# Patient Record
Sex: FEMALE | Race: WHITE | Employment: OTHER | URBAN - METROPOLITAN AREA
[De-identification: names, ages, dates, MRNs, and addresses within clinical notes are randomized per-mention and may not be internally consistent; named-entity substitution may affect disease eponyms.]

---

## 2017-02-27 ENCOUNTER — OFFICE VISIT (OUTPATIENT)
Dept: CARDIOLOGY CLINIC | Age: 77
End: 2017-02-27

## 2017-02-27 VITALS
DIASTOLIC BLOOD PRESSURE: 80 MMHG | OXYGEN SATURATION: 96 % | HEIGHT: 66 IN | RESPIRATION RATE: 15 BRPM | HEART RATE: 62 BPM | BODY MASS INDEX: 26.06 KG/M2 | SYSTOLIC BLOOD PRESSURE: 136 MMHG | WEIGHT: 162.12 LBS

## 2017-02-27 DIAGNOSIS — I48.0 PAROXYSMAL ATRIAL FIBRILLATION (HCC): Primary | ICD-10-CM

## 2017-02-27 DIAGNOSIS — R06.02 SOB (SHORTNESS OF BREATH): ICD-10-CM

## 2017-02-27 DIAGNOSIS — E05.90 HYPERTHYROIDISM: ICD-10-CM

## 2017-02-27 DIAGNOSIS — E78.5 HYPERLIPIDEMIA, UNSPECIFIED HYPERLIPIDEMIA TYPE: ICD-10-CM

## 2017-02-27 PROCEDURE — 99214 OFFICE O/P EST MOD 30 MIN: CPT | Performed by: INTERNAL MEDICINE

## 2017-06-19 ENCOUNTER — HOSPITAL ENCOUNTER (OUTPATIENT)
Dept: ENDOSCOPY | Age: 77
Discharge: HOME | End: 2017-06-19
Attending: INTERNAL MEDICINE | Admitting: INTERNAL MEDICINE

## 2017-06-19 LAB
EKG ATRIAL RATE: 56 BPM
EKG DIAGNOSIS: NORMAL
EKG P AXIS: 52 DEGREES
EKG P-R INTERVAL: 272 MS
EKG Q-T INTERVAL: 446 MS
EKG QRS DURATION: 74 MS
EKG QTC CALCULATION (BAZETT): 430 MS
EKG R AXIS: -38 DEGREES
EKG T AXIS: 46 DEGREES
EKG VENTRICULAR RATE: 56 BPM

## 2017-06-19 PROCEDURE — 93010 ELECTROCARDIOGRAM REPORT: CPT | Performed by: INTERNAL MEDICINE

## 2017-06-19 RX ORDER — SODIUM CHLORIDE 0.9 % (FLUSH) 0.9 %
10 SYRINGE (ML) INJECTION EVERY 12 HOURS SCHEDULED
Status: DISCONTINUED | OUTPATIENT
Start: 2017-06-19 | End: 2017-06-20 | Stop reason: HOSPADM

## 2017-06-19 RX ORDER — SODIUM CHLORIDE 9 MG/ML
INJECTION, SOLUTION INTRAVENOUS CONTINUOUS
Status: DISCONTINUED | OUTPATIENT
Start: 2017-06-19 | End: 2017-06-20 | Stop reason: HOSPADM

## 2017-06-19 RX ORDER — SODIUM CHLORIDE 0.9 % (FLUSH) 0.9 %
10 SYRINGE (ML) INJECTION PRN
Status: DISCONTINUED | OUTPATIENT
Start: 2017-06-19 | End: 2017-06-20 | Stop reason: HOSPADM

## 2018-01-22 ENCOUNTER — OFFICE VISIT (OUTPATIENT)
Dept: ENT CLINIC | Age: 78
End: 2018-01-22

## 2018-01-22 VITALS — HEART RATE: 76 BPM | DIASTOLIC BLOOD PRESSURE: 72 MMHG | SYSTOLIC BLOOD PRESSURE: 110 MMHG

## 2018-01-22 DIAGNOSIS — R49.0 DYSPHONIA: Primary | ICD-10-CM

## 2018-01-22 PROCEDURE — 99213 OFFICE O/P EST LOW 20 MIN: CPT | Performed by: OTOLARYNGOLOGY

## 2018-01-22 NOTE — PROGRESS NOTES
The patient has noticed a weakening of her voice for the last 6 months. Initially she attributed this to postnasal drainage but of late this has not been contributing. She is followed for hyperthyroidism and a multinodular goiter    She works as a  but denies any vocal overuse or abuse    Denies difficulty chewing or swallowing. No sores have been seen in the mouth. There is no stridor or difficulty breathing. There is no past history of anterior neck or throat trauma. She is followed for atrial fibrillation and was recently started on Eliquis    The patient is a non smoker and is not exposed to second hand smoke or irritants. There have been no known contagious contacts. There are  no other symptoms referable to the upper aerodigestive tract. Exam:    The patient appears well developed and well nourished.; oriented to person, place, and time. The head is normocephalic and atraumatic; no facial scars or weakness are noted. The facial skeleton is normal.The voice has a normal quality and tonality to it. Eyes: Conjunctivae and lids normal. Full EOM. The skin is warm and dry. No pallor. The external nose is unremarkable including the dorsum, columella, nasion, and alae. The turbinates respond well to vasoconstriction. The middle and inferior meatuses appeared clear. There is no polyp, ulceration, or masses visualized either naris. The septum is not deviated or deflected to a significant degree. The lips and oral cavity are normal with no discrete mucosal disease, and normally hydrated. Tongue mobility is normal. The fungiform and vallate papillae are normal appearing. Dentition is unremarkable including the gingiva The soft palate and uvula are of normal configuration, with no displacement of the palatoglossal or palatopharyngeal folds, and no obstruction to the oropharynx. The posterior pharyngeal wall is normal appearing without exudate or drainage.   IL: Mirror exam shows a

## 2019-06-11 ENCOUNTER — APPOINTMENT (OUTPATIENT)
Dept: CT IMAGING | Age: 79
End: 2019-06-11
Payer: MEDICARE

## 2019-06-11 ENCOUNTER — HOSPITAL ENCOUNTER (EMERGENCY)
Age: 79
Discharge: ANOTHER HOSPITAL | End: 2019-06-11
Attending: EMERGENCY MEDICINE
Payer: MEDICARE

## 2019-06-11 VITALS
BODY MASS INDEX: 25.11 KG/M2 | OXYGEN SATURATION: 98 % | RESPIRATION RATE: 16 BRPM | HEIGHT: 67 IN | HEART RATE: 72 BPM | TEMPERATURE: 97.8 F | DIASTOLIC BLOOD PRESSURE: 82 MMHG | SYSTOLIC BLOOD PRESSURE: 186 MMHG | WEIGHT: 160 LBS

## 2019-06-11 DIAGNOSIS — R18.8 FREE FLUID IN PELVIS: ICD-10-CM

## 2019-06-11 DIAGNOSIS — Z79.01 ANTICOAGULATED: ICD-10-CM

## 2019-06-11 DIAGNOSIS — S32.009A CLOSED FRACTURE OF LUMBAR VERTEBRA, UNSPECIFIED FRACTURE MORPHOLOGY, UNSPECIFIED LUMBAR VERTEBRAL LEVEL, INITIAL ENCOUNTER (HCC): Primary | ICD-10-CM

## 2019-06-11 DIAGNOSIS — S22.31XA CLOSED FRACTURE OF ONE RIB OF RIGHT SIDE, INITIAL ENCOUNTER: ICD-10-CM

## 2019-06-11 LAB
A/G RATIO: 1.6 (ref 1.1–2.2)
ALBUMIN SERPL-MCNC: 4.7 G/DL (ref 3.4–5)
ALP BLD-CCNC: 78 U/L (ref 40–129)
ALT SERPL-CCNC: 18 U/L (ref 10–40)
ANION GAP SERPL CALCULATED.3IONS-SCNC: 11 MMOL/L (ref 3–16)
APTT: 40.8 SEC (ref 26–36)
AST SERPL-CCNC: 27 U/L (ref 15–37)
BASOPHILS ABSOLUTE: 0.1 K/UL (ref 0–0.2)
BASOPHILS RELATIVE PERCENT: 0.8 %
BILIRUB SERPL-MCNC: 1.7 MG/DL (ref 0–1)
BILIRUBIN URINE: NEGATIVE
BLOOD, URINE: ABNORMAL
BUN BLDV-MCNC: 18 MG/DL (ref 7–20)
CALCIUM SERPL-MCNC: 9.6 MG/DL (ref 8.3–10.6)
CHLORIDE BLD-SCNC: 103 MMOL/L (ref 99–110)
CLARITY: CLEAR
CO2: 26 MMOL/L (ref 21–32)
COLOR: YELLOW
CREAT SERPL-MCNC: 0.8 MG/DL (ref 0.6–1.2)
EOSINOPHILS ABSOLUTE: 0.1 K/UL (ref 0–0.6)
EOSINOPHILS RELATIVE PERCENT: 0.9 %
EPITHELIAL CELLS, UA: 1 /HPF (ref 0–5)
GFR AFRICAN AMERICAN: >60
GFR NON-AFRICAN AMERICAN: >60
GLOBULIN: 3 G/DL
GLUCOSE BLD-MCNC: 119 MG/DL (ref 70–99)
GLUCOSE URINE: NEGATIVE MG/DL
HCT VFR BLD CALC: 41 % (ref 36–48)
HEMOGLOBIN: 13.3 G/DL (ref 12–16)
HYALINE CASTS: 1 /LPF (ref 0–8)
INR BLD: 1.23 (ref 0.86–1.14)
KETONES, URINE: NEGATIVE MG/DL
LEUKOCYTE ESTERASE, URINE: ABNORMAL
LYMPHOCYTES ABSOLUTE: 1.2 K/UL (ref 1–5.1)
LYMPHOCYTES RELATIVE PERCENT: 13.9 %
MCH RBC QN AUTO: 29.1 PG (ref 26–34)
MCHC RBC AUTO-ENTMCNC: 32.4 G/DL (ref 31–36)
MCV RBC AUTO: 89.8 FL (ref 80–100)
MICROSCOPIC EXAMINATION: YES
MONOCYTES ABSOLUTE: 0.4 K/UL (ref 0–1.3)
MONOCYTES RELATIVE PERCENT: 4.9 %
NEUTROPHILS ABSOLUTE: 7.1 K/UL (ref 1.7–7.7)
NEUTROPHILS RELATIVE PERCENT: 79.5 %
NITRITE, URINE: NEGATIVE
PDW BLD-RTO: 14 % (ref 12.4–15.4)
PH UA: 7 (ref 5–8)
PLATELET # BLD: 273 K/UL (ref 135–450)
PMV BLD AUTO: 7.8 FL (ref 5–10.5)
POTASSIUM SERPL-SCNC: 4 MMOL/L (ref 3.5–5.1)
PROTEIN UA: 30 MG/DL
PROTHROMBIN TIME: 14 SEC (ref 9.8–13)
RBC # BLD: 4.56 M/UL (ref 4–5.2)
RBC UA: 6 /HPF (ref 0–4)
SODIUM BLD-SCNC: 140 MMOL/L (ref 136–145)
SPECIFIC GRAVITY UA: 1.01 (ref 1–1.03)
TOTAL PROTEIN: 7.7 G/DL (ref 6.4–8.2)
URINE REFLEX TO CULTURE: YES
URINE TYPE: ABNORMAL
UROBILINOGEN, URINE: 1 E.U./DL
WBC # BLD: 8.9 K/UL (ref 4–11)
WBC UA: 13 /HPF (ref 0–5)

## 2019-06-11 PROCEDURE — 6370000000 HC RX 637 (ALT 250 FOR IP): Performed by: PHYSICIAN ASSISTANT

## 2019-06-11 PROCEDURE — 72131 CT LUMBAR SPINE W/O DYE: CPT

## 2019-06-11 PROCEDURE — 74177 CT ABD & PELVIS W/CONTRAST: CPT

## 2019-06-11 PROCEDURE — 81001 URINALYSIS AUTO W/SCOPE: CPT

## 2019-06-11 PROCEDURE — 96360 HYDRATION IV INFUSION INIT: CPT

## 2019-06-11 PROCEDURE — 87086 URINE CULTURE/COLONY COUNT: CPT

## 2019-06-11 PROCEDURE — 85730 THROMBOPLASTIN TIME PARTIAL: CPT

## 2019-06-11 PROCEDURE — 72192 CT PELVIS W/O DYE: CPT

## 2019-06-11 PROCEDURE — 80053 COMPREHEN METABOLIC PANEL: CPT

## 2019-06-11 PROCEDURE — 99285 EMERGENCY DEPT VISIT HI MDM: CPT

## 2019-06-11 PROCEDURE — 2580000003 HC RX 258: Performed by: PHYSICIAN ASSISTANT

## 2019-06-11 PROCEDURE — 85610 PROTHROMBIN TIME: CPT

## 2019-06-11 PROCEDURE — 85025 COMPLETE CBC W/AUTO DIFF WBC: CPT

## 2019-06-11 PROCEDURE — 6360000004 HC RX CONTRAST MEDICATION: Performed by: EMERGENCY MEDICINE

## 2019-06-11 RX ORDER — HYDROCODONE BITARTRATE AND ACETAMINOPHEN 5; 325 MG/1; MG/1
1 TABLET ORAL ONCE
Status: COMPLETED | OUTPATIENT
Start: 2019-06-11 | End: 2019-06-11

## 2019-06-11 RX ORDER — LIDOCAINE 4 G/G
1 PATCH TOPICAL ONCE
Status: DISCONTINUED | OUTPATIENT
Start: 2019-06-11 | End: 2019-06-12 | Stop reason: HOSPADM

## 2019-06-11 RX ORDER — 0.9 % SODIUM CHLORIDE 0.9 %
1000 INTRAVENOUS SOLUTION INTRAVENOUS ONCE
Status: COMPLETED | OUTPATIENT
Start: 2019-06-11 | End: 2019-06-11

## 2019-06-11 RX ORDER — OXYCODONE HYDROCHLORIDE AND ACETAMINOPHEN 5; 325 MG/1; MG/1
1 TABLET ORAL ONCE
Status: COMPLETED | OUTPATIENT
Start: 2019-06-11 | End: 2019-06-11

## 2019-06-11 RX ADMIN — IOPAMIDOL 75 ML: 755 INJECTION, SOLUTION INTRAVENOUS at 21:13

## 2019-06-11 RX ADMIN — OXYCODONE HYDROCHLORIDE AND ACETAMINOPHEN 1 TABLET: 5; 325 TABLET ORAL at 21:51

## 2019-06-11 RX ADMIN — HYDROCODONE BITARTRATE AND ACETAMINOPHEN 1 TABLET: 5; 325 TABLET ORAL at 19:39

## 2019-06-11 RX ADMIN — SODIUM CHLORIDE 1000 ML: 9 INJECTION, SOLUTION INTRAVENOUS at 22:20

## 2019-06-11 ASSESSMENT — ENCOUNTER SYMPTOMS
NAUSEA: 0
BACK PAIN: 1
DIARRHEA: 0
ABDOMINAL PAIN: 0
BLOOD IN STOOL: 0
SHORTNESS OF BREATH: 0
VOMITING: 0

## 2019-06-11 ASSESSMENT — PAIN SCALES - GENERAL
PAINLEVEL_OUTOF10: 8
PAINLEVEL_OUTOF10: 10

## 2019-06-12 LAB — URINE CULTURE, ROUTINE: NORMAL

## 2019-06-12 NOTE — ED NOTES
Report called and given to Ventura Figueroa 69, ER Charge, at [facility]. No questions at this time. Report given to [facility]. No questions at this time. Pt A/O x4 at time of transport, no sign of distress. Belongings sent with family.       Gerald Reese RN  06/11/19 3335

## 2019-06-12 NOTE — ED PROVIDER NOTES
I independently performed a history and physical on DASIA Le. All diagnostic, treatment, and disposition decisions were made by myself in conjunction with the advanced practice provider. I have participated in the medical decision making and directed the treatment plan and disposition of the patient. For further details of [facility] emergency department encounter, please see the advanced practice provider's documentation. CHIEF COMPLAINT  Chief Complaint   Patient presents with    Fall     Pt reports she reached for board while doing yard work and fell onto back - pain to R side of low back x 1 hour     Briefly, DASIA Le is a 78 y.o. female  who presents to the ED complaining of reaching out for a board when doing yard work and then fell directly onto her right low back. She complains of right low back pain and right flank pain. She is anticoagulated. She denies any injury to the head or neck. She also denies any injuries to the extremities otherwise. FOCUSED PHYSICAL EXAMINATION  BP (!) 183/100   Pulse 71   Temp 98 °F (36.7 °C)   Resp 16   Ht 5' 7\" (1.702 m)   Wt 160 lb (72.6 kg)   SpO2 96%   BMI 25.06 kg/m²    Focused physical examination notable for no acute distress, well-appearing, well-nourished, normal speech and mentation without obvious facial droop, no obvious rash. No obvious cranial nerve deficits on my initial exam.  Bruising noted to the right low back and right flank, abdomen is soft nontender nondistended, there is midline lumbar spine tenderness but no cervical or thoracic spine tenderness. NC/AT. Normal strength and sensation in the legs. MDM:  Diagnostic considerations included intracranial injury, cervical spine injury, chest/abdominal organ injury, extremity injury, abrasion/laceration, contusion, fracture, sprain/strain, dislocation    ED course was notable for closed vertebral fractures of L1-L4 based on CT abdomen and pelvis.   The patient

## 2019-06-12 NOTE — ED PROVIDER NOTES
for chills and fever. Respiratory: Negative for shortness of breath. Cardiovascular: Negative for chest pain. Gastrointestinal: Negative for abdominal pain, blood in stool, diarrhea, nausea and vomiting. Genitourinary: Negative for difficulty urinating, dysuria and hematuria. Musculoskeletal: Positive for back pain. Skin: Negative for rash. Neurological: Negative for weakness, numbness and headaches. Positives and Pertinent negatives as per HPI. Except as noted abovein the ROS, all other systems were reviewed and negative. PAST MEDICAL HISTORY     Past Medical History:   Diagnosis Date    Atrial fibrillation ([facility] [facility]     Cancer ([facility] [address] [facility]     skin cancer    MVP (mitral valve prolapse)     Thyroid disorder     HYPER         SURGICAL HISTORY     Past Surgical History:   Procedure Laterality Date    APPENDECTOMY      CHOLECYSTECTOMY      COLON SURGERY      COLONOSCOPY      HEMICOLECTOMY  7-17-13    Laparoscopic right    HYSTERECTOMY      TONSILLECTOMY           CURRENTMEDICATIONS       Discharge Medication List as of 6/11/2019 11:26 PM      CONTINUE these medications which have NOT CHANGED    Details   metoprolol succinate (TOPROL XL) 50 MG extended release tablet TAKE 1 TABLET DAILY, Disp-90 tablet, R-3      methimazole (TAPAZOLE) 5 MG tablet   Take 2.5 mg by mouth daily                ALLERGIES     Ciprofloxacin and Sulfa antibiotics    FAMILYHISTORY     History reviewed. No pertinent family history.        SOCIAL HISTORY       Social History     Socioeconomic History    Marital status:      Spouse name: None    Number of children: None    Years of education: None    Highest education level: None   Occupational History    None   Social Needs    Financial resource strain: None    Food insecurity:     Worry: None     Inability: None    Transportation needs:     Medical: None     Non-medical: None   Tobacco Use    Smoking status: Never Smoker    Smokeless tobacco: Never Used   Substance and Sexual Activity    Alcohol use: No    Drug use: No    Sexual activity: Never   Lifestyle    Physical activity:     Days per week: None     Minutes per session: None    Stress: None   Relationships    Social connections:     Talks on phone: None     Gets together: None     Attends Yazidi service: None     Active member of club or organization: None     Attends meetings of clubs or organizations: None     Relationship status: None    Intimate partner violence:     Fear of current or ex partner: None     Emotionally abused: None     Physically abused: None     Forced sexual activity: None   Other Topics Concern    None   Social History Narrative    None       SCREENINGS             PHYSICAL EXAM    (up to 7 for level 4, 8 or more for level 5)     ED Triage Vitals   BP Temp Temp src Pulse Resp SpO2 Height Weight   06/11/19 1922 06/11/19 1921 -- 06/11/19 1921 06/11/19 1921 06/11/19 1921 06/11/19 1921 06/11/19 1921   (!) 183/100 98 °F (36.7 °C)  71 16 96 % 5' 7\" (1.702 m) 160 lb (72.6 kg)       Physical Exam   Constitutional: She is oriented to person, place, and time. She appears well-developed and well-nourished. HENT:   Head: Atraumatic. Eyes: Right eye exhibits no discharge. Left eye exhibits no discharge. Neck: Normal range of motion. Cardiovascular: Normal rate, regular rhythm and normal heart sounds. Exam reveals no gallop and no friction rub. No murmur heard. Pulmonary/Chest: Effort normal and breath sounds normal. No stridor. No respiratory distress. She has no wheezes. She has no rales. Abdominal: Soft. Bowel sounds are normal. She exhibits no distension and no mass. There is no tenderness. There is no rebound and no guarding. No hernia. Musculoskeletal: Normal range of motion. She exhibits tenderness. She exhibits no edema or deformity. Tenderness with very mild early ecchymosis over the right paralumbar musculature.   No point tenderness or step-off deformity in the neck or back. Some mild pain with flexion of right hip in the right low back. Negative contralateral straight leg raise. Full range of motion with flexion-extension bilateral hips, knees, ankles. Sensation light touch bilateral lower extremity is intact. Good up and downgoing toes. No foot drop. Neurological: She is alert and oriented to person, place, and time. No cranial nerve deficit. Skin: Skin is warm and dry. No rash noted. She is not diaphoretic. No erythema. Psychiatric: She has a normal mood and affect. Her behavior is normal.   Nursing note and vitals reviewed.       DIAGNOSTIC RESULTS   LABS:    Labs Reviewed   URINE RT REFLEX TO CULTURE - Abnormal; Notable for the following components:       Result Value    Blood, Urine SMALL (*)     Protein, UA 30 (*)     Leukocyte Esterase, Urine SMALL (*)     All other components within normal limits    Narrative:     Performed at:  [facility] [address],  [place], [place]   Phone [phone]   MICROSCOPIC URINALYSIS - Abnormal; Notable for the following components:    WBC, UA 13 (*)     RBC, UA 6 (*)     All other components within normal limits    Narrative:     Performed at:  [facility] [address], [place], [address]   Phone [phone]   COMPREHENSIVE METABOLIC PANEL - Abnormal; Notable for the following components:    Glucose 119 (*)     Total Bilirubin 1.7 (*)     All other components within normal limits    Narrative:     Performed at:  [facility] [address], [facility] [place]   Phone [phone]   APTT - Abnormal; Notable for the following components:    aPTT 40.8 (*)     All other components within normal limits    Narrative:     Performed at:  [facility] [address]  [place], [place]   Phone [phone] - Abnormal; Notable for the following components: Protime 14.0 (*)     INR 1.23 (*)     All other components within normal limits    Narrative:     Performed at:  [facility]  [address],  [place], [address]   Phone [phone]   URINE CULTURE   CBC WITH AUTO DIFFERENTIAL    Narrative:     Performed at:  [facility]  [address],  [place], [address]   Phone [phone]       All other labs were within normal range or not returned as of this dictation. EKG: All EKG's are interpreted by the Emergency Department Physician who either signs orCo-signs this chart in the absence of a cardiologist.  Please see their note for interpretation of EKG. RADIOLOGY:   Non-plain film images such as CT, Ultrasound and MRI are read by the radiologist. Plain radiographic images are visualized andpreliminarily interpreted by the  ED Provider with the below findings:        Interpretation perthe Radiologist below, if available at the time of this note:    CT ABDOMEN PELVIS W IV CONTRAST Additional Contrast? None   Final Result   Acute fractures of the right 1st through 4th lumbar transverse processes. Suspected nondisplaced right 12th rib fracture posteriorly. No solid visceral injury is detected. Small amount of free pelvic fluid, nonspecific but unusual for the patient's   age and gender. Close clinical surveillance recommended. CT PELVIS WO CONTRAST Additional Contrast? None   Final Result   No evidence of an acute fracture. If clinical symptoms persist, a follow-up   study may be helpful. CT LUMBAR SPINE WO CONTRAST   Final Result   Acute right L1 and L2 transverse process fractures. No results found.        PROCEDURES   Unless otherwise noted below, none     Procedures    CRITICAL CARE TIME   N/A    CONSULTS:  IP CONSULT TO TRAUMA SURGERY      EMERGENCY DEPARTMENT COURSE and DIFFERENTIALDIAGNOSIS/MDM:   Vitals:    Vitals:    06/11/19 1921 06/11/19 1922 06/11/19 2245   BP:  (!) 183/100 (!) 186/82   Pulse: 71  72   Resp: 16  16   Temp: 98 °F (36.7 °C)  97.8 °F (36.6 °C)   SpO2: 96%  98%   Weight: 160 lb (72.6 kg)     Height: 5' 7\" (1.702 m)         Patient was given thefollowing medications:  Medications   HYDROcodone-acetaminophen (NORCO) 5-325 MG per tablet 1 tablet (1 tablet Oral Given 6/11/19 1939)   iopamidol (ISOVUE-370) 76 % injection 75 mL (75 mLs Intravenous Given 6/11/19 2113)   oxyCODONE-acetaminophen (PERCOCET) 5-325 MG per tablet 1 tablet (1 tablet Oral Given 6/11/19 2151)   0.9 % sodium chloride bolus (0 mLs Intravenous Stopped 6/11/19 2318)       Patient presented with some right low back pain after mechanical fall. She had some ecchymosis of her right low back with some tenderness just right of the spine. CT of the pelvis and lumbar spine reveals transverse process fractures. She does have some hematuria given that she is anticoagulated CT of the abdomen and pelvis was obtained. This reveals a L1-L4 transverse process fracture with also possible 12th rib fracture with pelvic fluid noted. Given that she is anticoagulated with trauma attending did discuss this with [employer] as concern is this could be blood. Hemoglobin is stable here. She was given some IV fluids. She is adamant she did not hit her head or remembers the entire injury. She is been able to ambulate here. Low suspicion for cord injury, cauda equina or other emergent etiology. Abdominal exam remains benign. Vitals are unremarkable. Patient understood why she needed to be transferred and was stable at time of transfer. FINAL IMPRESSION      1. Closed fracture of lumbar vertebra, unspecified fracture morphology, unspecified lumbar vertebral level, initial encounter ([facility] [address]    2. Free fluid in pelvis    3. Anticoagulated    4.  Closed fracture of one rib of right side, initial encounter          DISPOSITION/PLAN   DISPOSITION Decision To Transfer 06/11/2019 09:54:31 PM      PATIENT REFERREDTO:  No follow-up provider specified.     DISCHARGE MEDICATIONS:  Discharge Medication List as of 6/11/2019 11:26 PM          DISCONTINUED MEDICATIONS:  Discharge Medication List as of 6/11/2019 11:26 PM                 (Please note that portions ofthis note were completed with a voice recognition program.  Efforts were made to edit the dictations but occasionally words are mis-transcribed.)    Isael Iniguez PA-C (electronically signed)           Isael Iniguez PA-C  06/12/19 7189

## 2019-12-02 ENCOUNTER — OFFICE VISIT (OUTPATIENT)
Dept: CARDIOLOGY CLINIC | Age: 79
End: 2019-12-02
Payer: MEDICARE

## 2019-12-02 VITALS
WEIGHT: 162.12 LBS | HEART RATE: 64 BPM | HEIGHT: 67 IN | BODY MASS INDEX: 25.45 KG/M2 | SYSTOLIC BLOOD PRESSURE: 124 MMHG | DIASTOLIC BLOOD PRESSURE: 76 MMHG

## 2019-12-02 DIAGNOSIS — R53.83 OTHER FATIGUE: ICD-10-CM

## 2019-12-02 DIAGNOSIS — I48.19 PERSISTENT ATRIAL FIBRILLATION (HCC): Primary | ICD-10-CM

## 2019-12-02 DIAGNOSIS — E78.5 HYPERLIPIDEMIA, UNSPECIFIED HYPERLIPIDEMIA TYPE: ICD-10-CM

## 2019-12-02 PROCEDURE — G8484 FLU IMMUNIZE NO ADMIN: HCPCS | Performed by: INTERNAL MEDICINE

## 2019-12-02 PROCEDURE — 1036F TOBACCO NON-USER: CPT | Performed by: INTERNAL MEDICINE

## 2019-12-02 PROCEDURE — G8400 PT W/DXA NO RESULTS DOC: HCPCS | Performed by: INTERNAL MEDICINE

## 2019-12-02 PROCEDURE — 1123F ACP DISCUSS/DSCN MKR DOCD: CPT | Performed by: INTERNAL MEDICINE

## 2019-12-02 PROCEDURE — G8417 CALC BMI ABV UP PARAM F/U: HCPCS | Performed by: INTERNAL MEDICINE

## 2019-12-02 PROCEDURE — 1090F PRES/ABSN URINE INCON ASSESS: CPT | Performed by: INTERNAL MEDICINE

## 2019-12-02 PROCEDURE — 4040F PNEUMOC VAC/ADMIN/RCVD: CPT | Performed by: INTERNAL MEDICINE

## 2019-12-02 PROCEDURE — G8427 DOCREV CUR MEDS BY ELIG CLIN: HCPCS | Performed by: INTERNAL MEDICINE

## 2019-12-02 PROCEDURE — 99214 OFFICE O/P EST MOD 30 MIN: CPT | Performed by: INTERNAL MEDICINE

## 2019-12-02 RX ORDER — FLUTICASONE PROPIONATE 50 MCG
2 SPRAY, SUSPENSION (ML) NASAL
COMMUNITY
Start: 2019-04-29

## 2019-12-02 RX ORDER — ACETAMINOPHEN 500 MG
500 TABLET ORAL EVERY 6 HOURS PRN
COMMUNITY

## 2020-03-09 ENCOUNTER — TELEPHONE (OUTPATIENT)
Dept: CARDIOLOGY CLINIC | Age: 80
End: 2020-03-09

## 2020-03-09 NOTE — TELEPHONE ENCOUNTER
Pt states she has increased sob and heaviness and fatigue. x couple of weeks States she has had some high BP at recent doctor visits, today it was 178/104.  Please call to advise

## 2020-03-10 ENCOUNTER — OFFICE VISIT (OUTPATIENT)
Dept: CARDIOLOGY CLINIC | Age: 80
End: 2020-03-10
Payer: MEDICARE

## 2020-03-10 VITALS
WEIGHT: 159.4 LBS | DIASTOLIC BLOOD PRESSURE: 80 MMHG | BODY MASS INDEX: 25.62 KG/M2 | HEIGHT: 66 IN | HEART RATE: 70 BPM | RESPIRATION RATE: 18 BRPM | SYSTOLIC BLOOD PRESSURE: 152 MMHG | OXYGEN SATURATION: 98 %

## 2020-03-10 PROCEDURE — 0296T PR EXT ECG > 48HR TO 21 DAY RCRD W/CONECT INTL RCRD: CPT | Performed by: INTERNAL MEDICINE

## 2020-03-10 PROCEDURE — G8417 CALC BMI ABV UP PARAM F/U: HCPCS | Performed by: INTERNAL MEDICINE

## 2020-03-10 PROCEDURE — G8427 DOCREV CUR MEDS BY ELIG CLIN: HCPCS | Performed by: INTERNAL MEDICINE

## 2020-03-10 PROCEDURE — 1036F TOBACCO NON-USER: CPT | Performed by: INTERNAL MEDICINE

## 2020-03-10 PROCEDURE — 93000 ELECTROCARDIOGRAM COMPLETE: CPT | Performed by: INTERNAL MEDICINE

## 2020-03-10 PROCEDURE — 99214 OFFICE O/P EST MOD 30 MIN: CPT | Performed by: INTERNAL MEDICINE

## 2020-03-10 PROCEDURE — 1090F PRES/ABSN URINE INCON ASSESS: CPT | Performed by: INTERNAL MEDICINE

## 2020-03-10 PROCEDURE — 1123F ACP DISCUSS/DSCN MKR DOCD: CPT | Performed by: INTERNAL MEDICINE

## 2020-03-10 PROCEDURE — G8484 FLU IMMUNIZE NO ADMIN: HCPCS | Performed by: INTERNAL MEDICINE

## 2020-03-10 PROCEDURE — G8400 PT W/DXA NO RESULTS DOC: HCPCS | Performed by: INTERNAL MEDICINE

## 2020-03-10 PROCEDURE — 4040F PNEUMOC VAC/ADMIN/RCVD: CPT | Performed by: INTERNAL MEDICINE

## 2020-03-10 RX ORDER — LISINOPRIL 10 MG/1
10 TABLET ORAL DAILY
Qty: 90 TABLET | Refills: 1 | Status: SHIPPED | OUTPATIENT
Start: 2020-03-10 | End: 2020-04-20 | Stop reason: SDUPTHER

## 2020-03-10 NOTE — PROGRESS NOTES
BENITO ordered a 7 day monitor for this pt. I educated the pt and spouse and placed a CAM on her. She verbalized understanding.

## 2020-03-10 NOTE — TELEPHONE ENCOUNTER
Spoke to the pt. Stated she went to the emergency room, saw the waiting room was full of people wearing mask. That scared her, so she went back home. Asking if she can be seen today. Medication list is correct.

## 2020-03-10 NOTE — PROGRESS NOTES
[address]   Cardiac Follow Up     Referring Provider:  Basia Pritchard MD     Chief Complaint   Patient presents with    Hypertension     Some S. O.B     Shortness of Breath        History of Present Illness:   Mrs Tana Chavarria is a [de-identified] y.o. female with a history of persistent afib. She states that she continues to feel very tired. She walks at the mall for exercise, she states that when she first begins to walk at times she will have chest tightness and shortness of breath. She continues to feel symptomatic with afib. Past Medical History:   has a past medical history of Atrial fibrillation ([facility] [address] Cancer ([facility] [address] MVP (mitral valve prolapse), and Thyroid disorder. Surgical History:   has a past surgical history that includes Colonoscopy; Hysterectomy; Tonsillectomy; Cholecystectomy; Appendectomy; hemicolectomy (7-17-13); and Colon surgery. Social History:   reports that she has never smoked. She has never used smokeless tobacco. She reports that she does not drink alcohol or use drugs. Family History:  No h/o premature CAD     Home Medications:  Prior to Admission medications    Medication Sig Start Date End Date Taking?  Authorizing Provider   vitamin D (CHOLECALCIFEROL) 1000 UNIT TABS tablet Take 1,500 Units by mouth   Yes Historical Provider, MD   apixaban (ELIQUIS) 5 MG TABS tablet Take 5 mg by mouth 3/25/19  Yes Historical Provider, MD   fluticasone (FLONASE) 50 MCG/ACT nasal spray 2 sprays by Nasal route 4/29/19  Yes Historical Provider, MD   acetaminophen (TYLENOL) 500 MG tablet Take 500 mg by mouth every 6 hours as needed for Pain   Yes Historical Provider, MD   Omega-3 Fatty Acids (FISH OIL OMEGA-3 PO) Take by mouth daily   Yes Historical Provider, MD   Calcium Carbonate Antacid (TUMS CALCIUM FOR LIFE BONE PO) Take by mouth daily   Yes Historical Provider, MD   metoprolol succinate (TOPROL XL) 50 MG extended release tablet TAKE 1 TABLET DAILY 11/23/16  Yes Leonard Medina MD methimazole (TAPAZOLE) 5 MG tablet   Take 2.5 mg by mouth daily    Yes Historical Provider, MD        Allergies:  Ciprofloxacin and Sulfa antibiotics     Review of Systems:   · Constitutional: there has been no unanticipated weight loss. There's been no change in , sleep pattern, or activity level.   +fatigue   · Eyes: No visual changes or diplopia. No scleral icterus. · ENT: No Headaches, hearing loss or vertigo. No mouth sores or sore throat. +hoarseness  · Cardiovascular: Reviewed in HPI  · Respiratory: No cough or wheezing, no sputum production. No hematemesis. · Gastrointestinal: No abdominal pain, appetite loss, blood in stools. No change in bowel or bladder habits. · Genitourinary: No dysuria, trouble voiding, or hematuria. · Musculoskeletal:  No gait disturbance, weakness or joint complaints. · Integumentary: No rash or pruritis. · Neurological: No headache, diplopia, change in muscle strength, numbness or tingling. No change in gait, balance, coordination, mood, affect, memory, mentation, behavior. · Psychiatric: No anxiety, no depression. · Endocrine: No malaise, fatigue or temperature intolerance. No excessive thirst, fluid intake, or urination. No tremor. · Hematologic/Lymphatic: No abnormal bruising or bleeding, blood clots or swollen lymph nodes. · Allergic/Immunologic: No nasal congestion or hives.     Physical Examination:    Vitals:    03/10/20 1324   BP: (!) 152/80   Pulse:    Resp:    SpO2:         Constitutional and General Appearance:  No acute distress  Skin:good turgor,intact without lesions  HEENT: EOMI ,normal  Neck:no JVD    Respiratory:  · Normal excursion and expansion without use of accessory muscles  · Resp Auscultation: Normal breath sounds without dullness  Cardiovascular:  · The apical impulses not displaced  · Heart tones are crisp and normal  · Cervical veins are not engorged  · The carotid upstroke is normal in amplitude and contour without delay or

## 2020-03-17 ENCOUNTER — TELEPHONE (OUTPATIENT)
Dept: CARDIOLOGY CLINIC | Age: 80
End: 2020-03-17

## 2020-03-17 NOTE — TELEPHONE ENCOUNTER
Patient is still having high blood pressures 178/95. Patient had a medication change and is still having a high blood pressure.   robert

## 2020-03-19 ENCOUNTER — TELEPHONE (OUTPATIENT)
Dept: CARDIOLOGY CLINIC | Age: 80
End: 2020-03-19

## 2020-03-19 NOTE — TELEPHONE ENCOUNTER
On 3/17  was told to increase her lisinopril from 10mg to 20mg (10mg QAM and 10mg QPM). 149/93 last night, this morning 143/106 before meds , and this afternoon 139/99. Should  change her meds ?

## 2020-03-26 ENCOUNTER — OFFICE VISIT (OUTPATIENT)
Dept: CARDIOLOGY CLINIC | Age: 80
End: 2020-03-26
Payer: MEDICARE

## 2020-03-26 VITALS
OXYGEN SATURATION: 95 % | HEART RATE: 75 BPM | WEIGHT: 157.7 LBS | BODY MASS INDEX: 25.34 KG/M2 | SYSTOLIC BLOOD PRESSURE: 120 MMHG | HEIGHT: 66 IN | DIASTOLIC BLOOD PRESSURE: 90 MMHG

## 2020-03-26 PROCEDURE — 4040F PNEUMOC VAC/ADMIN/RCVD: CPT | Performed by: INTERNAL MEDICINE

## 2020-03-26 PROCEDURE — 1090F PRES/ABSN URINE INCON ASSESS: CPT | Performed by: INTERNAL MEDICINE

## 2020-03-26 PROCEDURE — 1036F TOBACCO NON-USER: CPT | Performed by: INTERNAL MEDICINE

## 2020-03-26 PROCEDURE — G8484 FLU IMMUNIZE NO ADMIN: HCPCS | Performed by: INTERNAL MEDICINE

## 2020-03-26 PROCEDURE — 1123F ACP DISCUSS/DSCN MKR DOCD: CPT | Performed by: INTERNAL MEDICINE

## 2020-03-26 PROCEDURE — G8417 CALC BMI ABV UP PARAM F/U: HCPCS | Performed by: INTERNAL MEDICINE

## 2020-03-26 PROCEDURE — 0298T PR EXT ECG > 48HR TO 21 DAY REVIEW AND INTERPRETATN: CPT | Performed by: INTERNAL MEDICINE

## 2020-03-26 PROCEDURE — G8400 PT W/DXA NO RESULTS DOC: HCPCS | Performed by: INTERNAL MEDICINE

## 2020-03-26 PROCEDURE — 99214 OFFICE O/P EST MOD 30 MIN: CPT | Performed by: INTERNAL MEDICINE

## 2020-03-26 PROCEDURE — G8427 DOCREV CUR MEDS BY ELIG CLIN: HCPCS | Performed by: INTERNAL MEDICINE

## 2020-03-26 NOTE — PROGRESS NOTES
3 months for 7 day cardiac monitor. I appreciate the opportunity of cooperating in the care of this individual.    Orlando Guevara. Nerissa Kat M.D., [facility]    Patient's problem list, medications, allergies, past medical, surgical, social and family histories were reviewed and updated as appropriate. Scribe's attestation: This note was scribed in the presence of Dr. Nerissa Kat MD, by Lexie Porter RN. The scribe's documentation has been prepared under my direction and personally reviewed by me in its entirety. I confirm that the note above accurately reflects all work, treatment, procedures, and medical decision making performed by me.

## 2020-03-26 NOTE — TELEPHONE ENCOUNTER
RX APPROVAL:      Refill:   Requested Prescriptions      No prescriptions requested or ordered in this encounter      Last OV: 3/26/2020   Last EKG:   Last Labs:   Lab Results   Component Value Date    GLUCOSE 119 06/11/2019    BUN 18 06/11/2019    CREATININE 0.8 06/11/2019    LABGLOM >60 06/11/2019     06/11/2019    K 4.0 06/11/2019     06/11/2019    CO2 26 06/11/2019    CALCIUM 9.6 06/11/2019     Lab Results   Component Value Date     06/11/2019     06/11/2019    CO2 26 06/11/2019    ANIONGAP 11 06/11/2019    GLUCOSE 119 06/11/2019    BUN 18 06/11/2019    CREATININE 0.8 06/11/2019    LABGLOM >60 06/11/2019    GFRAA >60 06/11/2019    CALCIUM 9.6 06/11/2019    PROT 7.7 06/11/2019    LABALBU 4.7 06/11/2019    BILITOT 1.7 06/11/2019    ALKPHOS 78 06/11/2019    AST 27 06/11/2019    ALT 18 06/11/2019    GLOB 3.0 06/11/2019     Lab Results   Component Value Date    ALT 18 06/11/2019    AST 27 06/11/2019     Lab Results   Component Value Date    K 4.0 06/11/2019       Plan and labs reviewed

## 2020-03-30 ENCOUNTER — TELEPHONE (OUTPATIENT)
Dept: CARDIOLOGY CLINIC | Age: 80
End: 2020-03-30

## 2020-03-30 NOTE — TELEPHONE ENCOUNTER
NOE MARTIN Report    Holter monitor Report Results has been scanned into media and are ready for you to review.

## 2020-04-20 NOTE — TELEPHONE ENCOUNTER
Prescription refill    Last OV: 03/26/2020    Last Refill: 03/10/2020    Labs:  06/11/2019    Future Appt:  07/15/2020

## 2020-04-20 NOTE — TELEPHONE ENCOUNTER
Pt  calling pt was in to see LES on 03/26/2020 and after taking the lisinopril 1-2 wks it wasn't working so LES increased it to 20 mg daily. Pt is now running out of the supply she had and the Pharmacy states it's too soon to refill. Pt needs a new script for the 20 mg daily called into [facility] [address], [address] [facility] [phone].  Pls call to advise Thank you

## 2020-04-21 RX ORDER — LISINOPRIL 20 MG/1
20 TABLET ORAL DAILY
Qty: 90 TABLET | Refills: 3 | Status: SHIPPED | OUTPATIENT
Start: 2020-04-21 | End: 2020-07-16 | Stop reason: SDUPTHER

## 2020-04-21 NOTE — TELEPHONE ENCOUNTER
Pt states she will be out of medication on Friday 4/24/20.  Please send and call pt to advise when done

## 2020-06-08 ENCOUNTER — NURSE ONLY (OUTPATIENT)
Dept: CARDIOLOGY CLINIC | Age: 80
End: 2020-06-08
Payer: MEDICARE

## 2020-06-08 PROCEDURE — 0296T PR EXT ECG > 48HR TO 21 DAY RCRD W/CONECT INTL RCRD: CPT | Performed by: INTERNAL MEDICINE

## 2020-07-08 PROCEDURE — 0298T PR EXT ECG > 48HR TO 21 DAY REVIEW AND INTERPRETATN: CPT | Performed by: INTERNAL MEDICINE

## 2020-07-14 NOTE — PROGRESS NOTES
Review of Systems:   · Constitutional: there has been no unanticipated weight loss. There's been no change in , sleep pattern, or activity level.   +fatigue   · Eyes: No visual changes or diplopia. No scleral icterus. · ENT: No Headaches, hearing loss or vertigo. No mouth sores or sore throat. +hoarseness  · Cardiovascular: Reviewed in HPI  · Respiratory: No cough or wheezing, no sputum production. No hematemesis. · Gastrointestinal: No abdominal pain, appetite loss, blood in stools. No change in bowel or bladder habits. · Genitourinary: No dysuria, trouble voiding, or hematuria. · Musculoskeletal:  No gait disturbance, weakness or joint complaints. · Integumentary: No rash or pruritis. · Neurological: No headache, diplopia, change in muscle strength, numbness or tingling. No change in gait, balance, coordination, mood, affect, memory, mentation, behavior. · Psychiatric: No anxiety, no depression. · Endocrine: No malaise, fatigue or temperature intolerance. No excessive thirst, fluid intake, or urination. No tremor. · Hematologic/Lymphatic: No abnormal bruising or bleeding, blood clots or swollen lymph nodes. · Allergic/Immunologic: No nasal congestion or hives. Physical Examination:    Vitals:    07/15/20 1022   BP: 138/80   Pulse: 76        Constitutional and General Appearance:  No acute distress  Skin:good turgor,intact without lesions  HEENT: EOMI ,normal  Neck:no JVD    Respiratory:  · Normal excursion and expansion without use of accessory muscles  · Resp Auscultation: Normal breath sounds without dullness  Cardiovascular:  · The apical impulses not displaced  · Heart tones are crisp and normal. Irregularly irregular. · Cervical veins are not engorged  · The carotid upstroke is normal in amplitude and contour without delay or bruit  · Peripheral pulses are symmetrical and full  · There is no clubbing, cyanosis of the extremities. · No edema.   · Femoral Arteries: 2+ and equal  · Pedal Pulses: 2+ and equal   Abdomen:  · No masses or tenderness  · Liver/Spleen: No Abnormalities Noted  Neurological/Psychiatric:unusual affect  · Alert and oriented in all spheres  · Moves all extremities well  · Exhibits normal gait balance and coordination  · No abnormalities of mood, affect, memory, mentation, or behavior are noted    ECHO 6/9/2015    -Technically limited study due to lung interface.  -The patient appears to be in atrial fibrillation.  -Normal left ventricle size and systolic function with an estimated   ejection  fraction of 60%. No regional wall motion abnormalities are seen. -There is mild concentric left ventricular hypertrophy.  -Mild mitral regurgitation is present.  -Trivial aortic regurgitation is present.  -There is mild-moderate tricuspid regurgitation with RVSP estimated at 33  MmHg    6/15  CT abd     IMPRESSION: Widely patent mesenteric vessels. Other findings unchanged from the recent CT scan one day prior. Assessment:     1. Persistent afib: HR controlled, no flutters/palps  Remains on Eliquis 5mg BID  CAM monitor 6/2020> Afib 100% (repeat in one year)  EKG  3/10/20> Afib, HR 54      2. Fatigue/SOB, chronic: Improved     3. Hyperlipidemia: 10/2019>  hdl 74  ldl 142  Managed by pcp   Memory loss according to     4. Hypertension: Stable. Blood pressure 138/80, pulse 76, height 5' 6.5\" (1.689 m), weight 153 lb 12.8 oz (69.8 kg)  Early onset dementia    Plan:  Sheila Baldwin has a stable cardiac status. All cardiac test and lab results were personally reviewed by me during this office visit. 1. No med changes  2. Will continue with risk factor modification  3. Return to office in 6 months    I appreciate the opportunity of cooperating in the care of this individual.    Lamar Lowe. Zaria Camacho M.D., [facility] [place]    Patient's problem list, medications, allergies, past medical, surgical, social and family histories were reviewed and updated as appropriate.      Sean's attestation: This note was scribed in the presence of Dr. Quin Deras MD, by Thelma Simmonds, RN. The scribe's documentation has been prepared under my direction and personally reviewed by me in its entirety. I confirm that the note above accurately reflects all work, treatment, procedures, and medical decision making performed by me.

## 2020-07-15 ENCOUNTER — OFFICE VISIT (OUTPATIENT)
Dept: CARDIOLOGY CLINIC | Age: 80
End: 2020-07-15
Payer: MEDICARE

## 2020-07-15 VITALS
DIASTOLIC BLOOD PRESSURE: 80 MMHG | SYSTOLIC BLOOD PRESSURE: 138 MMHG | BODY MASS INDEX: 24.14 KG/M2 | HEART RATE: 76 BPM | HEIGHT: 67 IN | WEIGHT: 153.8 LBS

## 2020-07-15 PROCEDURE — 1123F ACP DISCUSS/DSCN MKR DOCD: CPT | Performed by: INTERNAL MEDICINE

## 2020-07-15 PROCEDURE — 4040F PNEUMOC VAC/ADMIN/RCVD: CPT | Performed by: INTERNAL MEDICINE

## 2020-07-15 PROCEDURE — G8427 DOCREV CUR MEDS BY ELIG CLIN: HCPCS | Performed by: INTERNAL MEDICINE

## 2020-07-15 PROCEDURE — G8420 CALC BMI NORM PARAMETERS: HCPCS | Performed by: INTERNAL MEDICINE

## 2020-07-15 PROCEDURE — 1090F PRES/ABSN URINE INCON ASSESS: CPT | Performed by: INTERNAL MEDICINE

## 2020-07-15 PROCEDURE — 1036F TOBACCO NON-USER: CPT | Performed by: INTERNAL MEDICINE

## 2020-07-15 PROCEDURE — G8400 PT W/DXA NO RESULTS DOC: HCPCS | Performed by: INTERNAL MEDICINE

## 2020-07-15 PROCEDURE — 99214 OFFICE O/P EST MOD 30 MIN: CPT | Performed by: INTERNAL MEDICINE

## 2020-07-15 NOTE — TELEPHONE ENCOUNTER
Per pt, please call [facility] and have them cancel the metoprolol 50 mg and add the new prescription for lisinopril. Pt keeps getting metoprolol but not the lisinopril. Please call pt to let them know it has been done.

## 2020-07-16 ENCOUNTER — TELEPHONE (OUTPATIENT)
Dept: CARDIOLOGY CLINIC | Age: 80
End: 2020-07-16

## 2020-07-16 RX ORDER — LISINOPRIL 20 MG/1
20 TABLET ORAL DAILY
Qty: 90 TABLET | Refills: 3 | Status: SHIPPED | OUTPATIENT
Start: 2020-07-16 | End: 2021-06-21

## 2020-10-15 ENCOUNTER — OFFICE VISIT (OUTPATIENT)
Dept: ENT CLINIC | Age: 80
End: 2020-10-15
Payer: MEDICARE

## 2020-10-15 VITALS — TEMPERATURE: 98 F | DIASTOLIC BLOOD PRESSURE: 98 MMHG | HEART RATE: 69 BPM | SYSTOLIC BLOOD PRESSURE: 156 MMHG

## 2020-10-15 PROCEDURE — 1090F PRES/ABSN URINE INCON ASSESS: CPT | Performed by: OTOLARYNGOLOGY

## 2020-10-15 PROCEDURE — G8420 CALC BMI NORM PARAMETERS: HCPCS | Performed by: OTOLARYNGOLOGY

## 2020-10-15 PROCEDURE — G8484 FLU IMMUNIZE NO ADMIN: HCPCS | Performed by: OTOLARYNGOLOGY

## 2020-10-15 PROCEDURE — G8427 DOCREV CUR MEDS BY ELIG CLIN: HCPCS | Performed by: OTOLARYNGOLOGY

## 2020-10-15 PROCEDURE — 99213 OFFICE O/P EST LOW 20 MIN: CPT | Performed by: OTOLARYNGOLOGY

## 2020-10-15 PROCEDURE — 4040F PNEUMOC VAC/ADMIN/RCVD: CPT | Performed by: OTOLARYNGOLOGY

## 2020-10-15 PROCEDURE — 1036F TOBACCO NON-USER: CPT | Performed by: OTOLARYNGOLOGY

## 2020-10-15 PROCEDURE — G8400 PT W/DXA NO RESULTS DOC: HCPCS | Performed by: OTOLARYNGOLOGY

## 2020-10-15 PROCEDURE — 1123F ACP DISCUSS/DSCN MKR DOCD: CPT | Performed by: OTOLARYNGOLOGY

## 2020-10-15 RX ORDER — OMEPRAZOLE 40 MG/1
40 CAPSULE, DELAYED RELEASE ORAL DAILY
Qty: 15 CAPSULE | Refills: 1 | Status: SHIPPED | OUTPATIENT
Start: 2020-10-15 | End: 2022-02-07

## 2020-10-15 RX ORDER — MONTELUKAST SODIUM 10 MG/1
10 TABLET ORAL NIGHTLY
Qty: 15 TABLET | Refills: 1 | Status: SHIPPED | OUTPATIENT
Start: 2020-10-15 | End: 2022-02-07

## 2020-10-15 NOTE — PROGRESS NOTES
erythema indicative of infection. There was not evidence of deviation of the septum which was not significant. There were not polyps present. .There were not other masses present. The turbinates were not enlarged beyond normal.  Hoarseness is likely to be related to some sinus drainage from allergy and also from laryngeal reflux. There is a third problem relative to her thyroid gland which does  need further evaluation. We will need her to try a course of Singulair along with omeprazole. A thyroid sonogram will be obtained as well.

## 2020-10-21 ENCOUNTER — HOSPITAL ENCOUNTER (OUTPATIENT)
Dept: ULTRASOUND IMAGING | Age: 80
Discharge: HOME | End: 2020-10-21
Payer: MEDICARE

## 2020-10-21 PROCEDURE — 76536 US EXAM OF HEAD AND NECK: CPT

## 2020-10-26 ENCOUNTER — TELEPHONE (OUTPATIENT)
Dept: CARDIOLOGY CLINIC | Age: 80
End: 2020-10-26

## 2020-10-26 NOTE — TELEPHONE ENCOUNTER
Medication Refill    Medication needing refilled:eliquis    Dosage of the medication:10mg     How are you taking this medication (QD, BID, TID, QID, PRN):    30 or 90 day supply called in:    Which Pharmacy are we sending the medication to?:  [facility]

## 2020-11-02 ENCOUNTER — HOSPITAL ENCOUNTER (OUTPATIENT)
Age: 80
Discharge: HOME | End: 2020-11-02
Payer: MEDICARE

## 2020-11-02 LAB
BASOPHILS ABSOLUTE: 0.1 K/UL (ref 0–0.2)
BASOPHILS RELATIVE PERCENT: 1.1 %
EOSINOPHILS ABSOLUTE: 0.1 K/UL (ref 0–0.6)
EOSINOPHILS RELATIVE PERCENT: 1.6 %
HCT VFR BLD CALC: 40.5 % (ref 36–48)
HEMOGLOBIN: 13.3 G/DL (ref 12–16)
LYMPHOCYTES ABSOLUTE: 1.9 K/UL (ref 1–5.1)
LYMPHOCYTES RELATIVE PERCENT: 28.3 %
MCH RBC QN AUTO: 28.9 PG (ref 26–34)
MCHC RBC AUTO-ENTMCNC: 32.9 G/DL (ref 31–36)
MCV RBC AUTO: 87.9 FL (ref 80–100)
MONOCYTES ABSOLUTE: 0.5 K/UL (ref 0–1.3)
MONOCYTES RELATIVE PERCENT: 7.7 %
NEUTROPHILS ABSOLUTE: 4 K/UL (ref 1.7–7.7)
NEUTROPHILS RELATIVE PERCENT: 61.3 %
PDW BLD-RTO: 14 % (ref 12.4–15.4)
PLATELET # BLD: 283 K/UL (ref 135–450)
PMV BLD AUTO: 7.4 FL (ref 5–10.5)
RBC # BLD: 4.61 M/UL (ref 4–5.2)
WBC # BLD: 6.6 K/UL (ref 4–11)

## 2020-11-02 PROCEDURE — 85025 COMPLETE CBC W/AUTO DIFF WBC: CPT

## 2020-11-02 PROCEDURE — 36415 COLL VENOUS BLD VENIPUNCTURE: CPT

## 2020-11-03 ENCOUNTER — HOSPITAL ENCOUNTER (OUTPATIENT)
Dept: CT IMAGING | Age: 80
Discharge: HOME | End: 2020-11-03
Payer: MEDICARE

## 2020-11-03 PROCEDURE — 6360000004 HC RX CONTRAST MEDICATION: Performed by: INTERNAL MEDICINE

## 2020-11-03 PROCEDURE — 74177 CT ABD & PELVIS W/CONTRAST: CPT

## 2020-11-03 RX ADMIN — IOPAMIDOL 75 ML: 755 INJECTION, SOLUTION INTRAVENOUS at 11:41

## 2020-11-03 RX ADMIN — IOHEXOL 50 ML: 240 INJECTION, SOLUTION INTRATHECAL; INTRAVASCULAR; INTRAVENOUS; ORAL at 10:30

## 2020-11-09 ENCOUNTER — TELEPHONE (OUTPATIENT)
Dept: CARDIOLOGY CLINIC | Age: 80
End: 2020-11-09

## 2020-11-09 NOTE — TELEPHONE ENCOUNTER
Just letting LES know she is having a colonoscopy 11/17 and she needs to hold her eliquis . How long should she hold eliquis ?

## 2020-11-09 NOTE — TELEPHONE ENCOUNTER
Spoke with the patient and advised her to hold Eliquis for 3 days prior to colonoscopy per LES. Patient voiced understanding .  Call complete

## 2021-01-12 NOTE — PROGRESS NOTES
[address]   Cardiac Follow Up     Referring Provider:  Dilcia Schwab MD     Chief Complaint   Patient presents with    Atrial Fibrillation    Hyperlipidemia    Hypertension      History of Present Illness:   Mrs Tez Malik is a [de-identified] y.o. female with a history of persistent afib. Today, she is here for regular follow up. Overall, she feels well. She denies exertional chest pain, palpitations, light-headedness, edema. She stays busy, does not smoke. Her  is with her for the visit. She tested positive for Covid in Dec with her  but was asymptomatic. Past Medical History:   has a past medical history of Atrial fibrillation ([address] Cancer ([facility] [address] MVP (mitral valve prolapse), and Thyroid disorder. Surgical History:   has a past surgical history that includes Colonoscopy; Hysterectomy; Tonsillectomy; Cholecystectomy; Appendectomy; hemicolectomy (7-17-13); and Colon surgery. Social History:   reports that she has never smoked. She has never used smokeless tobacco. She reports that she does not drink alcohol or use drugs. Family History:  No h/o premature CAD     Home Medications:  Prior to Admission medications    Medication Sig Start Date End Date Taking?  Authorizing Provider   apixaban (ELIQUIS) 5 MG TABS tablet Take 1 tablet by mouth 2 times daily 10/26/20  Yes Arti Delacruz MD   lisinopril (PRINIVIL;ZESTRIL) 20 MG tablet Take 1 tablet by mouth daily 7/16/20  Yes Arti Delacruz MD   vitamin D (CHOLECALCIFEROL) 1000 UNIT TABS tablet Take 1,500 Units by mouth   Yes Historical Provider, MD   fluticasone (FLONASE) 50 MCG/ACT nasal spray 2 sprays by Nasal route 4/29/19  Yes Historical Provider, MD   acetaminophen (TYLENOL) 500 MG tablet Take 500 mg by mouth every 6 hours as needed for Pain   Yes Historical Provider, MD   Omega-3 Fatty Acids (FISH OIL OMEGA-3 PO) Take by mouth daily   Yes Historical Provider, MD   Calcium Carbonate Antacid (TUMS CALCIUM FOR LIFE BONE PO) Take without dullness  Cardiovascular:  · The apical impulses not displaced  · Heart tones are crisp and normal. Irregularly irregular. · Cervical veins are not engorged  · The carotid upstroke is normal in amplitude and contour without delay or bruit  · Peripheral pulses are symmetrical and full  · There is no clubbing, cyanosis of the extremities. · No edema. · Femoral Arteries: 2+ and equal  · Pedal Pulses: 2+ and equal   Abdomen:  · No masses or tenderness  · Liver/Spleen: No Abnormalities Noted  Neurological/Psychiatric:unusual affect  · Alert and oriented in all spheres  · Moves all extremities well  · Exhibits normal gait balance and coordination  · No abnormalities of mood, affect, memory, mentation, or behavior are noted    ECHO 6/9/2015    -Technically limited study due to lung interface.  -The patient appears to be in atrial fibrillation.  -Normal left ventricle size and systolic function with an estimated   ejection  fraction of 60%. No regional wall motion abnormalities are seen. -There is mild concentric left ventricular hypertrophy.  -Mild mitral regurgitation is present.  -Trivial aortic regurgitation is present.  -There is mild-moderate tricuspid regurgitation with RVSP estimated at 33  MmHg    6/15  CT abd     IMPRESSION: Widely patent mesenteric vessels. Other findings unchanged from the recent CT scan one day prior. Assessment:     1. Persistent afib: HR controlled, no flutters/palps  Remains on Eliquis 5mg BID  CAM monitor 6/2020> Afib 100% (repeat in one year)  EKG  3/10/20> Afib, HR 54      2. Fatigue/SOB, chronic: Improved     3. Hyperlipidemia: 9/2020>  hdl 78  ldl 118  Managed by pcp   Memory loss according to     4. Hypertension: Stable. Blood pressure (!) 142/90, pulse 91, temperature 97.5 °F (36.4 °C), height 5' 7\" (1.702 m), weight 163 lb 6.4 oz (74.1 kg), SpO2 98 %, not currently breastfeeding.     5. Covid 19 in December- she states she was asymptomatic at the time Early onset dementia    Plan:  Digna Du has a stable cardiac status. All cardiac test and lab results were personally reviewed by me during this office visit. 1. No med changes- covid 19 antibody testing   2. Will continue with risk factor modification  3. Return to office in 6 months    I appreciate the opportunity of cooperating in the care of this individual.    Jacky Newman. Veda Carreon M.D., [facility] [place]    Patient's problem list, medications, allergies, past medical, surgical, social and family histories were reviewed and updated as appropriate. Scribe's attestation: This note was scribed in the presence of Dr. Veda Carreon MD, by Mague Ortiz RN. The scribe's documentation has been prepared under my direction and personally reviewed by me in its entirety. I confirm that the note above accurately reflects all work, treatment, procedures, and medical decision making performed by me.

## 2021-01-25 ENCOUNTER — OFFICE VISIT (OUTPATIENT)
Dept: CARDIOLOGY CLINIC | Age: 81
End: 2021-01-25
Payer: MEDICARE

## 2021-01-25 ENCOUNTER — HOSPITAL ENCOUNTER (OUTPATIENT)
Age: 81
Discharge: HOME | End: 2021-01-25
Payer: MEDICARE

## 2021-01-25 VITALS
HEART RATE: 91 BPM | TEMPERATURE: 97.5 F | BODY MASS INDEX: 25.65 KG/M2 | HEIGHT: 67 IN | WEIGHT: 163.4 LBS | SYSTOLIC BLOOD PRESSURE: 142 MMHG | OXYGEN SATURATION: 98 % | DIASTOLIC BLOOD PRESSURE: 90 MMHG

## 2021-01-25 DIAGNOSIS — U07.1 COVID-19 VIRUS INFECTION: ICD-10-CM

## 2021-01-25 DIAGNOSIS — I48.11 LONGSTANDING PERSISTENT ATRIAL FIBRILLATION (HCC): Primary | ICD-10-CM

## 2021-01-25 DIAGNOSIS — R06.02 SOB (SHORTNESS OF BREATH): ICD-10-CM

## 2021-01-25 DIAGNOSIS — I10 ESSENTIAL HYPERTENSION: ICD-10-CM

## 2021-01-25 DIAGNOSIS — E78.5 HYPERLIPIDEMIA, UNSPECIFIED HYPERLIPIDEMIA TYPE: ICD-10-CM

## 2021-01-25 PROCEDURE — G8417 CALC BMI ABV UP PARAM F/U: HCPCS | Performed by: INTERNAL MEDICINE

## 2021-01-25 PROCEDURE — G8427 DOCREV CUR MEDS BY ELIG CLIN: HCPCS | Performed by: INTERNAL MEDICINE

## 2021-01-25 PROCEDURE — 99214 OFFICE O/P EST MOD 30 MIN: CPT | Performed by: INTERNAL MEDICINE

## 2021-01-25 PROCEDURE — G8484 FLU IMMUNIZE NO ADMIN: HCPCS | Performed by: INTERNAL MEDICINE

## 2021-01-25 PROCEDURE — 4040F PNEUMOC VAC/ADMIN/RCVD: CPT | Performed by: INTERNAL MEDICINE

## 2021-01-25 PROCEDURE — 1090F PRES/ABSN URINE INCON ASSESS: CPT | Performed by: INTERNAL MEDICINE

## 2021-01-25 PROCEDURE — 86769 SARS-COV-2 COVID-19 ANTIBODY: CPT

## 2021-01-25 PROCEDURE — 1123F ACP DISCUSS/DSCN MKR DOCD: CPT | Performed by: INTERNAL MEDICINE

## 2021-01-25 PROCEDURE — G8400 PT W/DXA NO RESULTS DOC: HCPCS | Performed by: INTERNAL MEDICINE

## 2021-01-25 PROCEDURE — 1036F TOBACCO NON-USER: CPT | Performed by: INTERNAL MEDICINE

## 2021-01-27 LAB — SARS-COV-2, IGG: POSITIVE

## 2021-03-29 NOTE — TELEPHONE ENCOUNTER
Received refill request for Eliquis 5 mg from [facility].     Last OV: 1/25/21 LES    Last Labs: 11/2/20    Last Refill: 10/26/20 #180 with 1 refill    Next Appt: 7/14/21 LES

## 2021-03-30 RX ORDER — APIXABAN 5 MG/1
TABLET, FILM COATED ORAL
Qty: 180 TABLET | Refills: 1 | Status: SHIPPED | OUTPATIENT
Start: 2021-03-30 | End: 2021-11-09

## 2021-06-21 RX ORDER — LISINOPRIL 20 MG/1
TABLET ORAL
Qty: 90 TABLET | Refills: 3 | Status: SHIPPED | OUTPATIENT
Start: 2021-06-21 | End: 2022-06-22

## 2021-06-21 NOTE — TELEPHONE ENCOUNTER
Received refill request for Lisinopril 20 mg from [facility].     Last OV: 1/25/21 LES    Last Labs: 6/11/19     Last Refill: 7/16/20 #90 with 3 refills    Next Appt: 7/14/21 LES

## 2021-07-07 NOTE — PROGRESS NOTES
[facility]   Cardiac Follow Up     Referring Provider:  Brie Walsh MD     Chief Complaint   Patient presents with    Atrial Fibrillation    Hyperlipidemia      History of Present Illness:   Mrs Sundar Magaña is a 80 y.o. female with a history of persistent afib. Today, she is here for regular follow up. Overall, she feels well. She denies exertional chest pain, palpitations, light-headedness, edema. She stays busy, does not smoke. Her  is with her for the visit. She tested positive for Covid in Dec with her  but was asymptomatic. She has started Aricept for her memory. Continues w/ shortness of breath w/ steps, unchanged. Past Medical History:   has a past medical history of Atrial fibrillation ([address] Cancer ([address] MVP (mitral valve prolapse), and Thyroid disorder. Surgical History:   has a past surgical history that includes Colonoscopy; Hysterectomy; Tonsillectomy; Cholecystectomy; Appendectomy; hemicolectomy (7-17-13); and Colon surgery. Social History:   reports that she has never smoked. She has never used smokeless tobacco. She reports that she does not drink alcohol and does not use drugs. Family History:  No h/o premature CAD     Home Medications:  Prior to Admission medications    Medication Sig Start Date End Date Taking?  Authorizing Provider   donepezil (ARICEPT) 5 MG tablet TAKE 1 TABLET BY MOUTH AT BEDTIME 7/8/21  Yes Historical Provider, MD   Apoaequorin (PREVAGEN EXTRA STRENGTH) 20 MG CAPS Take by mouth   Yes Historical Provider, MD   lisinopril (PRINIVIL;ZESTRIL) 20 MG tablet TAKE 1 TABLET DAILY 6/21/21  Yes Zbigniew Hannon MD   ELIQUIS 5 MG TABS tablet TAKE 1 TABLET TWICE A DAY 3/30/21  Yes Zbigniew Hannon MD   vitamin D (CHOLECALCIFEROL) 1000 UNIT TABS tablet Take 1,500 Units by mouth   Yes Historical Provider, MD   fluticasone [facility]) 50 MCG/ACT nasal spray 2 sprays by Nasal route 4/29/19  Yes Historical Provider, MD   Omega-3 Fatty Acids ([address] and General Appearance:  No acute distress  Skin:good turgor,intact without lesions  HEENT: EOMI ,normal  Neck:no JVD    Respiratory:  · Normal excursion and expansion without use of accessory muscles  · Resp Auscultation: Normal breath sounds without dullness  Cardiovascular:  · The apical impulses not displaced  · Heart tones are crisp and normal. Irregularly irregular. · Cervical veins are not engorged  · The carotid upstroke is normal in amplitude and contour without delay or bruit  · Peripheral pulses are symmetrical and full  · There is no clubbing, cyanosis of the extremities. · No edema. · Femoral Arteries: 2+ and equal  · Pedal Pulses: 2+ and equal   Abdomen:  · No masses or tenderness  · Liver/Spleen: No Abnormalities Noted  Neurological/Psychiatric:unusual affect  · Alert and oriented in all spheres  · Moves all extremities well  · Exhibits normal gait balance and coordination  · No abnormalities of mood, affect, memory, mentation, or behavior are noted    ECHO 6/9/2015    -Technically limited study due to lung interface.  -The patient appears to be in atrial fibrillation.  -Normal left ventricle size and systolic function with an estimated   ejection  fraction of 60%. No regional wall motion abnormalities are seen. -There is mild concentric left ventricular hypertrophy.  -Mild mitral regurgitation is present.  -Trivial aortic regurgitation is present.  -There is mild-moderate tricuspid regurgitation with RVSP estimated at 33  MmHg    6/15  CT abd     IMPRESSION: Widely patent mesenteric vessels. Other findings unchanged from the recent CT scan one day prior. Assessment:     1. Persistent afib: HR controlled, no flutters/palps  Remains on Eliquis 5mg BID  CAM monitor 6/2020> Afib 100% (repeat in one year)  EKG  3/10/20> Afib, HR 54      2. Fatigue/SOB, chronic: unchanged      3. Hyperlipidemia: 3/2021>  hdl 72  ldl 126  Managed by pcp   Memory loss according to     4.   Hypertension: Stable. Blood pressure 126/80, pulse 62, height 5' 7\" (1.702 m), weight 163 lb (73.9 kg), SpO2 98 %, not currently breastfeeding. 5. Covid 19 in December- she states she was asymptomatic at the time- would like to be antibody tested - +antibody in [place]    Early onset dementia- has had recent testing     Plan:  Robert Mcguire has a stable cardiac status. All cardiac test and lab results were personally reviewed by me during this office visit. 1. No med changes- covid 19 antibody testing   2. Will continue with risk factor modification  3. Return to office in 6 months    I appreciate the opportunity of cooperating in the care of this individual.    Consuelo Rizzo. Silvia Carrion M.D., [facility] [place]      Patient's problem list, medications, allergies, past medical, surgical, social and family histories were reviewed and updated as appropriate. Scribe's attestation: This note was scribed in the presence of Dr. Silvia Carrion MD, by Kylie Del Valle RN. The scribe's documentation has been prepared under my direction and personally reviewed by me in its entirety. I confirm that the note above accurately reflects all work, treatment, procedures, and medical decision making performed by me.

## 2021-07-14 ENCOUNTER — OFFICE VISIT (OUTPATIENT)
Dept: CARDIOLOGY CLINIC | Age: 81
End: 2021-07-14
Payer: MEDICARE

## 2021-07-14 VITALS
SYSTOLIC BLOOD PRESSURE: 126 MMHG | HEART RATE: 62 BPM | BODY MASS INDEX: 25.58 KG/M2 | OXYGEN SATURATION: 98 % | DIASTOLIC BLOOD PRESSURE: 80 MMHG | WEIGHT: 163 LBS | HEIGHT: 67 IN

## 2021-07-14 DIAGNOSIS — U07.1 COVID-19 VIRUS INFECTION: ICD-10-CM

## 2021-07-14 DIAGNOSIS — E78.5 HYPERLIPIDEMIA, UNSPECIFIED HYPERLIPIDEMIA TYPE: ICD-10-CM

## 2021-07-14 DIAGNOSIS — I10 ESSENTIAL HYPERTENSION: ICD-10-CM

## 2021-07-14 DIAGNOSIS — R06.02 SOB (SHORTNESS OF BREATH): ICD-10-CM

## 2021-07-14 DIAGNOSIS — I48.91 ATRIAL FIBRILLATION, UNSPECIFIED TYPE (HCC): Primary | ICD-10-CM

## 2021-07-14 LAB — SARS-COV-2 ANTIBODY, TOTAL: POSITIVE

## 2021-07-14 PROCEDURE — 4040F PNEUMOC VAC/ADMIN/RCVD: CPT | Performed by: INTERNAL MEDICINE

## 2021-07-14 PROCEDURE — 1090F PRES/ABSN URINE INCON ASSESS: CPT | Performed by: INTERNAL MEDICINE

## 2021-07-14 PROCEDURE — 99214 OFFICE O/P EST MOD 30 MIN: CPT | Performed by: INTERNAL MEDICINE

## 2021-07-14 PROCEDURE — G8427 DOCREV CUR MEDS BY ELIG CLIN: HCPCS | Performed by: INTERNAL MEDICINE

## 2021-07-14 PROCEDURE — 1123F ACP DISCUSS/DSCN MKR DOCD: CPT | Performed by: INTERNAL MEDICINE

## 2021-07-14 PROCEDURE — 1036F TOBACCO NON-USER: CPT | Performed by: INTERNAL MEDICINE

## 2021-07-14 PROCEDURE — G8400 PT W/DXA NO RESULTS DOC: HCPCS | Performed by: INTERNAL MEDICINE

## 2021-07-14 PROCEDURE — G8417 CALC BMI ABV UP PARAM F/U: HCPCS | Performed by: INTERNAL MEDICINE

## 2021-07-14 RX ORDER — DONEPEZIL HYDROCHLORIDE 5 MG/1
TABLET, FILM COATED ORAL
COMMUNITY
Start: 2021-07-08

## 2021-07-27 ENCOUNTER — HOSPITAL ENCOUNTER (OUTPATIENT)
Age: 81
Discharge: HOME | End: 2021-07-27
Payer: MEDICARE

## 2021-07-27 LAB
BASOPHILS ABSOLUTE: 0.1 K/UL (ref 0–0.2)
BASOPHILS RELATIVE PERCENT: 1.3 %
EOSINOPHILS ABSOLUTE: 0.2 K/UL (ref 0–0.6)
EOSINOPHILS RELATIVE PERCENT: 2.2 %
HCT VFR BLD CALC: 38.7 % (ref 36–48)
HEMOGLOBIN: 13 G/DL (ref 12–16)
LYMPHOCYTES ABSOLUTE: 2.3 K/UL (ref 1–5.1)
LYMPHOCYTES RELATIVE PERCENT: 29.3 %
MCH RBC QN AUTO: 29 PG (ref 26–34)
MCHC RBC AUTO-ENTMCNC: 33.5 G/DL (ref 31–36)
MCV RBC AUTO: 86.7 FL (ref 80–100)
MONOCYTES ABSOLUTE: 0.5 K/UL (ref 0–1.3)
MONOCYTES RELATIVE PERCENT: 6.1 %
NEUTROPHILS ABSOLUTE: 4.8 K/UL (ref 1.7–7.7)
NEUTROPHILS RELATIVE PERCENT: 61.1 %
PDW BLD-RTO: 13.5 % (ref 12.4–15.4)
PLATELET # BLD: 319 K/UL (ref 135–450)
PMV BLD AUTO: 7.2 FL (ref 5–10.5)
RBC # BLD: 4.47 M/UL (ref 4–5.2)
WBC # BLD: 7.9 K/UL (ref 4–11)

## 2021-07-27 PROCEDURE — 36415 COLL VENOUS BLD VENIPUNCTURE: CPT

## 2021-07-27 PROCEDURE — 85025 COMPLETE CBC W/AUTO DIFF WBC: CPT

## 2021-08-04 ENCOUNTER — HOSPITAL ENCOUNTER (OUTPATIENT)
Age: 81
Discharge: HOME | End: 2021-08-04
Payer: MEDICARE

## 2021-08-04 LAB
A/G RATIO: 1.4 (ref 1.1–2.2)
ALBUMIN SERPL-MCNC: 4.6 G/DL (ref 3.4–5)
ALP BLD-CCNC: 80 U/L (ref 40–129)
ALT SERPL-CCNC: <5 U/L (ref 10–40)
AMYLASE: 62 U/L (ref 25–115)
ANION GAP SERPL CALCULATED.3IONS-SCNC: 17 MMOL/L (ref 3–16)
AST SERPL-CCNC: 25 U/L (ref 15–37)
BILIRUB SERPL-MCNC: 2 MG/DL (ref 0–1)
BUN BLDV-MCNC: 10 MG/DL (ref 7–20)
CALCIUM SERPL-MCNC: 9.6 MG/DL (ref 8.3–10.6)
CHLORIDE BLD-SCNC: 101 MMOL/L (ref 99–110)
CO2: 23 MMOL/L (ref 21–32)
CREAT SERPL-MCNC: 0.9 MG/DL (ref 0.6–1.2)
GFR AFRICAN AMERICAN: >60
GFR NON-AFRICAN AMERICAN: >60
GLOBULIN: 3.3 G/DL
GLUCOSE BLD-MCNC: 82 MG/DL (ref 70–99)
POTASSIUM SERPL-SCNC: 4.3 MMOL/L (ref 3.5–5.1)
SODIUM BLD-SCNC: 141 MMOL/L (ref 136–145)
TOTAL PROTEIN: 7.9 G/DL (ref 6.4–8.2)
TSH SERPL DL<=0.05 MIU/L-ACNC: 0.28 UIU/ML (ref 0.27–4.2)

## 2021-08-04 PROCEDURE — 82150 ASSAY OF AMYLASE: CPT

## 2021-08-04 PROCEDURE — 84443 ASSAY THYROID STIM HORMONE: CPT

## 2021-08-04 PROCEDURE — 80053 COMPREHEN METABOLIC PANEL: CPT

## 2021-08-04 PROCEDURE — 36415 COLL VENOUS BLD VENIPUNCTURE: CPT

## 2021-08-09 ENCOUNTER — HOSPITAL ENCOUNTER (OUTPATIENT)
Dept: CT IMAGING | Age: 81
Discharge: HOME | End: 2021-08-09
Payer: MEDICARE

## 2021-08-09 DIAGNOSIS — R10.9 ABDOMINAL PAIN, UNSPECIFIED ABDOMINAL LOCATION: ICD-10-CM

## 2021-08-09 PROCEDURE — 6360000004 HC RX CONTRAST MEDICATION: Performed by: EMERGENCY MEDICINE

## 2021-08-09 PROCEDURE — 74177 CT ABD & PELVIS W/CONTRAST: CPT

## 2021-08-09 RX ADMIN — IOHEXOL 50 ML: 240 INJECTION, SOLUTION INTRATHECAL; INTRAVASCULAR; INTRAVENOUS; ORAL at 16:29

## 2021-08-09 RX ADMIN — IOPAMIDOL 75 ML: 755 INJECTION, SOLUTION INTRAVENOUS at 16:29

## 2021-11-09 RX ORDER — APIXABAN 5 MG/1
TABLET, FILM COATED ORAL
Qty: 180 TABLET | Refills: 1 | Status: SHIPPED | OUTPATIENT
Start: 2021-11-09 | End: 2022-05-10

## 2021-12-14 ENCOUNTER — HOSPITAL ENCOUNTER (OUTPATIENT)
Dept: GENERAL RADIOLOGY | Age: 81
Discharge: HOME | End: 2021-12-14
Payer: MEDICARE

## 2021-12-14 DIAGNOSIS — R10.9 ABDOMINAL PAIN, UNSPECIFIED ABDOMINAL LOCATION: ICD-10-CM

## 2021-12-14 PROCEDURE — 74250 X-RAY XM SM INT 1CNTRST STD: CPT

## 2022-02-05 NOTE — PROGRESS NOTES
[facility]   Cardiac Follow Up     Referring Provider:  Nerissa Montejo MD     Chief Complaint   Patient presents with    Hyperlipidemia    6 Month Follow-Up      History of Present Illness:   Mrs Ky Warner is a 80 y.o. female with a history of persistent Atrial fib, hypertension, and hyperlipidemia. Today, she is at the visit with her . She was diagnosed with Alzheimer's by  neurologist.  She was started on a new medication for memory, but  has not been seen an improvement as yet. Neurologist was also concerned about her mildly elevated LDL and thought she may benefit from LDL under 100. She enjoys doing puzzles with her . She denies palpitations, chest discomfort, dizziness, or change in breathing. Past Medical History:   has a past medical history of Atrial fibrillation ([address] Cancer ([address] MVP (mitral valve prolapse), and Thyroid disorder. Surgical History:   has a past surgical history that includes Colonoscopy; Hysterectomy; Tonsillectomy; Cholecystectomy; Appendectomy; hemicolectomy (7-17-13); and Colon surgery. Social History:   reports that she has never smoked. She has never used smokeless tobacco. She reports that she does not drink alcohol and does not use drugs. Family History:  No h/o premature CAD     Home Medications:  Prior to Admission medications    Medication Sig Start Date End Date Taking?  Authorizing Provider   Multiple Vitamins-Minerals (VITAMIN D3 COMPLETE PO) Take 2,000 mg by mouth   Yes Historical Provider, MD   B-12 TR 2000 MCG TBCR TAKE 1 TABLET BY MOUTH DAILY 1/6/22  Yes Historical Provider, MD   ELIQUIS 5 MG TABS tablet TAKE 1 TABLET TWICE A DAY 11/9/21  Yes Abigail Boggs MD   donepezil (ARICEPT) 5 MG tablet TAKE 1 TABLET BY MOUTH AT BEDTIME 7/8/21  Yes Historical Provider, MD   Apoaequorin (PREVAGEN EXTRA STRENGTH) 20 MG CAPS Take by mouth   Yes Historical Provider, MD   lisinopril (PRINIVIL;ZESTRIL) 20 MG tablet TAKE 1 TABLET DAILY 6/21/21  Yes Ritu Oh MD   vitamin D (CHOLECALCIFEROL) 1000 UNIT TABS tablet Take 1,500 Units by mouth   Yes Historical Provider, MD   fluticasone (FLONASE) 50 MCG/ACT nasal spray 2 sprays by Nasal route 4/29/19  Yes Historical Provider, MD   acetaminophen (TYLENOL) 500 MG tablet Take 500 mg by mouth every 6 hours as needed for Pain   Yes Historical Provider, MD   Omega-3 Fatty Acids (FISH OIL OMEGA-3 PO) Take by mouth daily   Yes Historical Provider, MD   Calcium Carbonate Antacid (TUMS CALCIUM FOR LIFE BONE PO) Take by mouth daily   Yes Historical Provider, MD        Allergies:  Ciprofloxacin and Sulfa antibiotics     Review of Systems:   · Constitutional: there has been no unanticipated weight loss. There's been no change in , sleep pattern, or activity level.   +fatigue   · Eyes: No visual changes or diplopia. No scleral icterus. · ENT: No Headaches, hearing loss or vertigo. No mouth sores or sore throat. · Cardiovascular: Reviewed in HPI  · Respiratory: No cough or wheezing, no sputum production. No hematemesis. +SARGENT  · Gastrointestinal: No abdominal pain, appetite loss, blood in stools. No change in bowel or bladder habits. · Genitourinary: No dysuria, trouble voiding, or hematuria. · Musculoskeletal:  No gait disturbance, weakness or joint complaints. · Integumentary: No rash or pruritis. · Neurological: No headache, diplopia, change in muscle strength, numbness or tingling. No change in gait, balance, coordination, mood, affect, memory, mentation, behavior. · Psychiatric: No anxiety, no depression. · Endocrine: No malaise, fatigue or temperature intolerance. No excessive thirst, fluid intake, or urination. No tremor. · Hematologic/Lymphatic: No abnormal bruising or bleeding, blood clots or swollen lymph nodes. · Allergic/Immunologic: No nasal congestion or hives.     Physical Examination:    Vitals:    02/07/22 1133   BP: 132/82   Pulse: 68   SpO2: 98% Constitutional and General Appearance:  No acute distress  Skin:good turgor,intact without lesions  HEENT: EOMI ,normal  Neck:no JVD    Respiratory:  · Normal excursion and expansion without use of accessory muscles  · Resp Auscultation: Normal breath sounds without dullness  Cardiovascular:  · The apical impulses not displaced  · Heart tones are crisp and normal. Irregularly irregular. · Cervical veins are not engorged  · The carotid upstroke is normal in amplitude and contour without delay or bruit  · Peripheral pulses are symmetrical and full  · There is no clubbing, cyanosis of the extremities. · No edema. · Femoral Arteries: 2+ and equal  · Pedal Pulses: 2+ and equal   Abdomen:  · No masses or tenderness  · Liver/Spleen: No Abnormalities Noted  Neurological/Psychiatric:unusual affect  · Alert and oriented in all spheres  · Moves all extremities well  · Exhibits normal gait balance and coordination  · No abnormalities of mood, affect, memory, mentation, or behavior are noted    ECHO 6/9/2015    -Technically limited study due to lung interface.  -The patient appears to be in atrial fibrillation.  -Normal left ventricle size and systolic function with an estimated   ejection  fraction of 60%. No regional wall motion abnormalities are seen. -There is mild concentric left ventricular hypertrophy.  -Mild mitral regurgitation is present.  -Trivial aortic regurgitation is present.  -There is mild-moderate tricuspid regurgitation with RVSP estimated at 33  MmHg    6/15  CT abd     IMPRESSION: Widely patent mesenteric vessels. Other findings unchanged from the recent CT scan one day prior. Assessment:     1. Persistent afib: HR controlled, no flutters/palps  Remains on Eliquis 5mg BID  CAM monitor 6/2020> Afib 100%   EKG  3/10/20> Afib, HR 54      2. Fatigue/SOB, chronic:   Unchanged - no significant complaints today    3.   Hyperlipidemia: 3/2021>  Tc 214, Tg 80, hdl 72  ldl 126  Neurologist recommending LDL under 100 to help with progression of atherosclerosis which could contribute to Alzheimers. Currently on Fish oil  Add Atorvastatin 20 mg daily. Repeat fasting lipids and liver enzymes in two months. Monitor for side effects including muscle aches and possible worsening memory loss      4. Hypertension:   Stable on current meds    Blood pressure 132/82, pulse 68, height 5' 7\" (1.702 m), weight 170 lb 12.8 oz (77.5 kg), SpO2 98 %, not currently breastfeeding. 5. Covid 19 in December 2020  - was asymptomatic with COVID  -  1/2021 + COVID antibody  - 7/14/21 +  COVID antibody   -  not interested in getting COVID vaccine for his wife or himself, but is interested in rechecking for COVID antibody at least one year COVID infection. Plan:  Mariela Vasquez has a stable cardiac status. All cardiac test and lab results were personally reviewed by me during this office visit. Start Atorvastatin 20 mg daily.  to call office for side effects (muscle aches, possible worsening of memory loss)   Recheck fasting lipids, AST, ALT in two months  Continue risk factor modification  Call our office with any concerning cardiac symptoms  Follow up in six months    I appreciate the opportunity of cooperating in the care of this individual.    Josh Trimble M.D., [facility] [place]      Patient's problem list, medications, allergies, past medical, surgical, social and family histories were reviewed and updated as appropriate. Scribe's attestation: This note was scribed in the presence of Josh Trimble M.D. by Monse Trent RN    The scribe's documentation has been prepared under my direction and personally reviewed by me in its entirety. I confirm that the note above accurately reflects all work, treatment, procedures, and medical decision making performed by me.

## 2022-02-07 ENCOUNTER — OFFICE VISIT (OUTPATIENT)
Dept: CARDIOLOGY CLINIC | Age: 82
End: 2022-02-07
Payer: MEDICARE

## 2022-02-07 VITALS
HEIGHT: 67 IN | SYSTOLIC BLOOD PRESSURE: 132 MMHG | DIASTOLIC BLOOD PRESSURE: 82 MMHG | OXYGEN SATURATION: 98 % | HEART RATE: 68 BPM | BODY MASS INDEX: 26.81 KG/M2 | WEIGHT: 170.8 LBS

## 2022-02-07 DIAGNOSIS — Z86.16 HISTORY OF COVID-19: ICD-10-CM

## 2022-02-07 DIAGNOSIS — E78.5 HYPERLIPIDEMIA, UNSPECIFIED HYPERLIPIDEMIA TYPE: ICD-10-CM

## 2022-02-07 DIAGNOSIS — I48.19 PERSISTENT ATRIAL FIBRILLATION (HCC): Primary | ICD-10-CM

## 2022-02-07 DIAGNOSIS — I10 ESSENTIAL HYPERTENSION: ICD-10-CM

## 2022-02-07 DIAGNOSIS — R06.02 SOB (SHORTNESS OF BREATH): ICD-10-CM

## 2022-02-07 PROCEDURE — G8400 PT W/DXA NO RESULTS DOC: HCPCS | Performed by: INTERNAL MEDICINE

## 2022-02-07 PROCEDURE — G8427 DOCREV CUR MEDS BY ELIG CLIN: HCPCS | Performed by: INTERNAL MEDICINE

## 2022-02-07 PROCEDURE — 99214 OFFICE O/P EST MOD 30 MIN: CPT | Performed by: INTERNAL MEDICINE

## 2022-02-07 PROCEDURE — G8417 CALC BMI ABV UP PARAM F/U: HCPCS | Performed by: INTERNAL MEDICINE

## 2022-02-07 PROCEDURE — 1123F ACP DISCUSS/DSCN MKR DOCD: CPT | Performed by: INTERNAL MEDICINE

## 2022-02-07 PROCEDURE — 1036F TOBACCO NON-USER: CPT | Performed by: INTERNAL MEDICINE

## 2022-02-07 PROCEDURE — 93000 ELECTROCARDIOGRAM COMPLETE: CPT | Performed by: INTERNAL MEDICINE

## 2022-02-07 PROCEDURE — 1090F PRES/ABSN URINE INCON ASSESS: CPT | Performed by: INTERNAL MEDICINE

## 2022-02-07 PROCEDURE — 4040F PNEUMOC VAC/ADMIN/RCVD: CPT | Performed by: INTERNAL MEDICINE

## 2022-02-07 PROCEDURE — G8484 FLU IMMUNIZE NO ADMIN: HCPCS | Performed by: INTERNAL MEDICINE

## 2022-02-07 RX ORDER — ATORVASTATIN CALCIUM 20 MG/1
20 TABLET, FILM COATED ORAL DAILY
Qty: 30 TABLET | Refills: 5 | Status: SHIPPED | OUTPATIENT
Start: 2022-02-07 | End: 2022-07-11

## 2022-02-07 RX ORDER — WITCH HAZEL 50 %
PADS, MEDICATED (EA) TOPICAL
COMMUNITY
Start: 2022-01-06

## 2022-02-07 NOTE — PATIENT INSTRUCTIONS
Start Atorvastatin 20 mg daily.     Monitor for potential side effects including worsening memory, muscle aches  Recheck fasting lipids, AST, ALT in two months  Continue risk factor modification  Call our office with any concerning cardiac symptoms  Follow up in six months

## 2022-02-09 LAB — SARS-COV-2, IGG: ABNORMAL

## 2022-04-25 DIAGNOSIS — E78.5 HYPERLIPIDEMIA, UNSPECIFIED HYPERLIPIDEMIA TYPE: ICD-10-CM

## 2022-04-25 LAB
CHOLESTEROL, TOTAL: 133 MG/DL (ref 0–199)
HDLC SERPL-MCNC: 62 MG/DL (ref 40–60)
LDL CHOLESTEROL CALCULATED: 59 MG/DL
TRIGL SERPL-MCNC: 62 MG/DL (ref 0–150)
VLDLC SERPL CALC-MCNC: 12 MG/DL

## 2022-05-09 NOTE — TELEPHONE ENCOUNTER
Received refill request for Eliquis from [facility] pharmacy.     Last ov:02/07/2022 LES    Last labs:09/16/2021 In care everywhere    Last Refill:11/09/2021 #180 tabs w/ 1 refill    Next appointment:08/24/2022 LES

## 2022-05-10 RX ORDER — APIXABAN 5 MG/1
TABLET, FILM COATED ORAL
Qty: 180 TABLET | Refills: 1 | Status: SHIPPED | OUTPATIENT
Start: 2022-05-10

## 2022-06-22 RX ORDER — LISINOPRIL 20 MG/1
TABLET ORAL
Qty: 90 TABLET | Refills: 3 | Status: SHIPPED | OUTPATIENT
Start: 2022-06-22

## 2022-07-09 DIAGNOSIS — E78.5 HYPERLIPIDEMIA, UNSPECIFIED HYPERLIPIDEMIA TYPE: ICD-10-CM

## 2022-07-11 RX ORDER — ATORVASTATIN CALCIUM 20 MG/1
20 TABLET, FILM COATED ORAL DAILY
Qty: 90 TABLET | Refills: 3 | Status: SHIPPED | OUTPATIENT
Start: 2022-07-11 | End: 2022-08-24 | Stop reason: SDUPTHER

## 2022-08-17 NOTE — PROGRESS NOTES
[facility]   Cardiac Follow Up     Referring Provider:  Damian Samson MD     Chief Complaint   Patient presents with    Hyperlipidemia    6 Month Follow-Up        History of Present Illness:   Mrs Clover Trent is a 80 y.o. female with a history of persistent Atrial fib, hypertension, and hyperlipidemia. She was diagnosed with Alzheimer's by  neurologist. She enjoys doing puzzles with her . In July 2022, she presented to Dr Case Mccollum ([facility]) with a new concern regarding urination. Onset of symptoms was 2 days ago. She c/o hematuria and incontinence. Urinary symptoms waxing and waning since onset. She was started on Keflex. Today, she is here for 6 mos follow up. She is with her . She states she and her  like to go out and take car rides. Pt denies exertional chest pain, SARGENT/PND, palpitations, light-headedness, edema. Past Medical History:   has a past medical history of Atrial fibrillation ([address] Cancer ([address] MVP (mitral valve prolapse), and Thyroid disorder. Surgical History:   has a past surgical history that includes Colonoscopy; Hysterectomy; Tonsillectomy; Cholecystectomy; Appendectomy; hemicolectomy (7-17-13); and Colon surgery. Social History:   reports that she has never smoked. She has never used smokeless tobacco. She reports that she does not drink alcohol and does not use drugs. Family History:  No h/o premature CAD     Home Medications:  Prior to Admission medications    Medication Sig Start Date End Date Taking?  Authorizing Provider   cetirizine (ZYRTEC) 10 MG tablet Take 10 mg by mouth daily 4/4/22  Yes Historical Provider, MD   atorvastatin (LIPITOR) 20 MG tablet TAKE 1 TABLET BY MOUTH DAILY 7/11/22  Yes Daylin Page MD   lisinopril (PRINIVIL;ZESTRIL) 20 MG tablet TAKE 1 TABLET DAILY 6/22/22  Yes Daylin Page MD   ELIQUIS 5 MG TABS tablet TAKE 1 TABLET TWICE A DAY 5/10/22  Yes Daylin Page MD   Multiple Vitamins-Minerals (VITAMIN D3 COMPLETE PO) Take 2,000 mg by mouth   Yes Historical Provider, MD NEWMAN TR 2000 MCG TBCR TAKE 1 TABLET BY MOUTH DAILY 1/6/22  Yes Historical Provider, MD   donepezil (ARICEPT) 5 MG tablet TAKE 1 TABLET BY MOUTH AT BEDTIME 7/8/21  Yes Historical Provider, MD   Apoaequorin (PREVAGEN EXTRA STRENGTH) 20 MG CAPS Take by mouth   Yes Historical Provider, MD   vitamin D (CHOLECALCIFEROL) 1000 UNIT TABS tablet Take 1,500 Units by mouth   Yes Historical Provider, MD   fluticasone (FLONASE) 50 MCG/ACT nasal spray 2 sprays by Nasal route 4/29/19  Yes Historical Provider, MD   acetaminophen (TYLENOL) 500 MG tablet Take 500 mg by mouth every 6 hours as needed for Pain   Yes Historical Provider, MD   Omega-3 Fatty Acids (FISH OIL OMEGA-3 PO) Take by mouth daily   Yes Historical Provider, MD   Calcium Carbonate Antacid (TUMS CALCIUM FOR LIFE BONE PO) Take by mouth daily   Yes Historical Provider, MD        Allergies:  Ciprofloxacin and Sulfa antibiotics     Review of Systems:   Constitutional: there has been no unanticipated weight loss. There's been no change in , sleep pattern, or activity level. Eyes: No visual changes or diplopia. No scleral icterus. ENT: No Headaches, hearing loss or vertigo. No mouth sores or sore throat. Cardiovascular: Reviewed in HPI  Respiratory: No cough or wheezing, no sputum production. No hematemesis. Gastrointestinal: No abdominal pain, appetite loss, blood in stools. No change in bowel or bladder habits. Genitourinary: No dysuria, trouble voiding, or hematuria. Musculoskeletal:  No gait disturbance, weakness or joint complaints. Integumentary: No rash or pruritis. Neurological: No headache, diplopia, change in muscle strength, numbness or tingling. No change in gait, balance, coordination, mood, affect, memory, mentation, behavior. Psychiatric: No anxiety, no depression. Endocrine: No malaise, fatigue or temperature intolerance. No excessive thirst, fluid intake, or urination.  No tremor. Hematologic/Lymphatic: No abnormal bruising or bleeding, blood clots or swollen lymph nodes. Allergic/Immunologic: No nasal congestion or hives. Physical Examination:    Vitals:    08/24/22 1412   BP: 130/72   Pulse: 73   SpO2: 99%          Constitutional and General Appearance:  No acute distress  Skin:good turgor,intact without lesions  HEENT: EOMI ,normal  Neck:no JVD    Respiratory:  Normal excursion and expansion without use of accessory muscles  Resp Auscultation: Normal breath sounds without dullness  Cardiovascular: The apical impulses not displaced  Heart tones are crisp and normal. Irregularly irregular. Cervical veins are not engorged  The carotid upstroke is normal in amplitude and contour without delay or bruit  Peripheral pulses are symmetrical and full  There is no clubbing, cyanosis of the extremities. No edema. Femoral Arteries: 2+ and equal  Pedal Pulses: 2+ and equal   Abdomen:  No masses or tenderness  Liver/Spleen: No Abnormalities Noted  Neurological/Psychiatric:unusual affect  Alert and oriented in all spheres  Moves all extremities well  Exhibits normal gait balance and coordination  No abnormalities of mood, affect, memory, mentation, or behavior are noted    ECHO 6/9/2015    -Technically limited study due to lung interface.  -The patient appears to be in atrial fibrillation.  -Normal left ventricle size and systolic function with an estimated   ejection  fraction of 60%. No regional wall motion abnormalities are seen. -There is mild concentric left ventricular hypertrophy.  -Mild mitral regurgitation is present.  -Trivial aortic regurgitation is present.  -There is mild-moderate tricuspid regurgitation with RVSP estimated at 33  MmHg    6/15  CT abd     IMPRESSION: Widely patent mesenteric vessels. Other findings unchanged from the recent CT scan one day prior. Assessment:     1.  Persistent afib:   HR controlled and irregular, no flutters/palpitations, HR 73 today  Asymptomatic  Continue on Eliquis 5mg BID  CAM monitor 6/2020> Afib 100%   EKG  3/10/20> Afib, HR 54      2. Fatigue/SOB, chronic:   Unchanged - denies any symptoms     3. Hyperlipidemia:   3/2021>  Tc 214, Tg 80, hdl 72  ldl 126  4/25/22 >  TG 62 HDL 62 LDL 59  Neurologist recommending LDL under 100 to help with progression of atherosclerosis which could contribute to Alzheimers. Currently on Fish oil  Started Atorvastatin 20 mg daily 2/7/22      Monitor for side effects including muscle aches and possible worsening memory loss      4. Hypertension:   stable  Continue on current meds   /72   Pulse 73   Ht 5' 7\" (1.702 m)   Wt 163 lb 6.4 oz (74.1 kg)   SpO2 99%   BMI 25.59 kg/m²       5. Covid 19 in December 2020  - was asymptomatic with COVID  -  1/2021 + COVID antibody  - 7/14/21 +  COVID antibody   -  not interested in getting COVID vaccine for his wife or himself, but is interested in rechecking for COVID antibody at least one year COVID infection. Plan:  Cardiac test and lab results personally reviewed by me during this office visit and discussed. Refill eliquis and atorvastatin  No medication changes  Continue risk factor modifications. Call for any change in symptoms, call to report any changes in shortness of breath or development of chest pain with activity. Follow up in 6 mos    I appreciate the opportunity of cooperating in the care of this individual.    Hilda Oakley. Malini Hubbard M.D., [facility]      Patient's problem list, medications, allergies, past medical, surgical, social and family histories were reviewed and updated as appropriate. Scribe's attestation: This note was scribed in the presence of Dr Malini Hubbard by Vishnu Greenberg, DOUGLAS. The scribe's documentation has been prepared under my direction and personally reviewed by me in its entirety.  I confirm that the note above accurately reflects all work, treatment, procedures, and medical decision making performed by me.

## 2022-08-24 ENCOUNTER — OFFICE VISIT (OUTPATIENT)
Dept: CARDIOLOGY CLINIC | Age: 82
End: 2022-08-24
Payer: MEDICARE

## 2022-08-24 VITALS
OXYGEN SATURATION: 99 % | BODY MASS INDEX: 25.65 KG/M2 | HEART RATE: 73 BPM | SYSTOLIC BLOOD PRESSURE: 130 MMHG | DIASTOLIC BLOOD PRESSURE: 72 MMHG | WEIGHT: 163.4 LBS | HEIGHT: 67 IN

## 2022-08-24 DIAGNOSIS — R06.02 SOB (SHORTNESS OF BREATH): ICD-10-CM

## 2022-08-24 DIAGNOSIS — E78.5 HYPERLIPIDEMIA, UNSPECIFIED HYPERLIPIDEMIA TYPE: ICD-10-CM

## 2022-08-24 DIAGNOSIS — I48.19 PERSISTENT ATRIAL FIBRILLATION (HCC): Primary | ICD-10-CM

## 2022-08-24 PROCEDURE — 1036F TOBACCO NON-USER: CPT | Performed by: INTERNAL MEDICINE

## 2022-08-24 PROCEDURE — G8400 PT W/DXA NO RESULTS DOC: HCPCS | Performed by: INTERNAL MEDICINE

## 2022-08-24 PROCEDURE — 1090F PRES/ABSN URINE INCON ASSESS: CPT | Performed by: INTERNAL MEDICINE

## 2022-08-24 PROCEDURE — G8427 DOCREV CUR MEDS BY ELIG CLIN: HCPCS | Performed by: INTERNAL MEDICINE

## 2022-08-24 PROCEDURE — 99214 OFFICE O/P EST MOD 30 MIN: CPT | Performed by: INTERNAL MEDICINE

## 2022-08-24 PROCEDURE — 1123F ACP DISCUSS/DSCN MKR DOCD: CPT | Performed by: INTERNAL MEDICINE

## 2022-08-24 PROCEDURE — G8417 CALC BMI ABV UP PARAM F/U: HCPCS | Performed by: INTERNAL MEDICINE

## 2022-08-24 RX ORDER — ATORVASTATIN CALCIUM 20 MG/1
20 TABLET, FILM COATED ORAL DAILY
Qty: 90 TABLET | Refills: 3 | Status: SHIPPED | OUTPATIENT
Start: 2022-08-24

## 2022-08-24 RX ORDER — CETIRIZINE HYDROCHLORIDE 10 MG/1
10 TABLET ORAL DAILY
COMMUNITY
Start: 2022-04-04

## 2022-08-24 NOTE — PATIENT INSTRUCTIONS
Refill eliquis and atorvastatin  No medication changes  Continue risk factor modifications. Call for any change in symptoms, call to report any changes in shortness of breath or development of chest pain with activity.     Follow up in 6 mos

## 2022-11-23 RX ORDER — APIXABAN 5 MG/1
TABLET, FILM COATED ORAL
Qty: 180 TABLET | Refills: 1 | OUTPATIENT
Start: 2022-11-23

## 2023-02-01 ENCOUNTER — TELEPHONE (OUTPATIENT)
Dept: CARDIOLOGY CLINIC | Age: 83
End: 2023-02-01

## 2023-02-01 NOTE — TELEPHONE ENCOUNTER
Medication Refill    Medication needing refilled:   ELIQUIS    Dosage of the medication: 5mg    How are you taking this medication (QD, BID, TID, QID, PRN):  TAKE 1 TABLET TWICE A DAY    30 or 90 day supply called in: 108    When will you run out of your medication:    Which Pharmacy are we sending the medication to?:    [address]  Phone:   [phone]

## 2023-02-01 NOTE — PROGRESS NOTES
[facility], [place], operated by [facility]   Cardiac Follow Up     Referring Provider:  Chiara Varghese MD     Chief Complaint   Patient presents with    Atrial Fibrillation     No cardiac symptoms at this time    6 Month Follow-Up          History of Present Illness:   Mrs Sadi Odell is a 80 y.o. female with a history of persistent Atrial fib, hypertension, and hyperlipidemia. She was diagnosed with Alzheimer's by  neurologist. She enjoys doing puzzles with her . In July 2022, she presented to Dr Mariella Berkowitz ([facility]) with a new concern regarding urination. Onset of symptoms was 2 days ago. She c/o hematuria and incontinence. Urinary symptoms waxing and waning since onset. She was started on Keflex. Today, she is here for 6 mos follow up. She is with her . They got  on 7/11/59. They were high school sweethearts, they met at [facility]. About a mos ago she was treated for a viral infection, upper respiratory symptoms, she reports she has now recovered from it. She continues to see Dr Donavon Shah. Pt denies exertional chest pain, SARGENT/PND, palpitations, light-headedness, edema. Past Medical History:   has a past medical history of Atrial fibrillation ([address] [address] MVP (mitral valve prolapse), and Thyroid disorder. Surgical History:   has a past surgical history that includes Colonoscopy; Hysterectomy; Tonsillectomy; Cholecystectomy; Appendectomy; hemicolectomy (7-17-13); and Colon surgery. Social History:   reports that she has never smoked. She has never used smokeless tobacco. She reports that she does not drink alcohol and does not use drugs. Family History:  No h/o premature CAD     Home Medications:  Prior to Admission medications    Medication Sig Start Date End Date Taking?  Authorizing Provider   Cyanocobalamin (VITAMIN B 12 PO) Take 2,000 mcg by mouth   Yes Historical Provider, MD   ZINC PO Take by mouth   Yes Historical Provider, MD   apixaban (ELIQUIS) 5 MG TABS tablet TAKE 1 TABLET TWICE A DAY 2/1/23  Yes Kalyn Christian MD   cetirizine (ZYRTEC) 10 MG tablet Take 10 mg by mouth daily 4/4/22  Yes Historical Provider, MD   atorvastatin (LIPITOR) 20 MG tablet Take 1 tablet by mouth daily 8/24/22  Yes Kalyn Christian MD   lisinopril (PRINIVIL;ZESTRIL) 20 MG tablet TAKE 1 TABLET DAILY 6/22/22  Yes Kalyn Christian MD   donepezil (ARICEPT ODT) 10 MG disintegrating tablet 10 mg 7/8/21  Yes Historical Provider, MD   vitamin D (CHOLECALCIFEROL) 1000 UNIT TABS tablet Take 1,500 Units by mouth   Yes Historical Provider, MD   fluticasone (FLONASE) 50 MCG/ACT nasal spray 2 sprays by Nasal route 4/29/19  Yes Historical Provider, MD   acetaminophen (TYLENOL) 500 MG tablet Take 500 mg by mouth every 6 hours as needed for Pain   Yes Historical Provider, MD   Calcium Carbonate Antacid (TUMS CALCIUM FOR LIFE BONE PO) Take by mouth daily   Yes Historical Provider, MD   apixaban (ELIQUIS) 5 MG TABS tablet Take 1 tablet by mouth 2 times daily  Patient not taking: Reported on 3/1/2023 8/24/22   Kalyn Christian MD   Multiple Vitamins-Minerals (VITAMIN D3 COMPLETE PO) Take 2,000 mg by mouth  Patient not taking: Reported on 3/1/2023    Historical Provider, MD   B-12 TR 2000 MCG TBCR TAKE 1 TABLET BY MOUTH DAILY  Patient not taking: Reported on 3/1/2023 1/6/22   Historical Provider, MD   Apoaequorin (PREVAGEN EXTRA STRENGTH) 20 MG CAPS Take by mouth  Patient not taking: Reported on 3/1/2023    Historical Provider, MD   Omega-3 Fatty Acids (FISH OIL OMEGA-3 PO) Take by mouth daily  Patient not taking: Reported on 3/1/2023    Historical Provider, MD        Allergies:  Ciprofloxacin and Sulfa antibiotics     Review of Systems:   Constitutional: there has been no unanticipated weight loss. There's been no change in , sleep pattern, or activity level. Eyes: No visual changes or diplopia. No scleral icterus. ENT: No Headaches, hearing loss or vertigo. No mouth sores or sore throat.    Cardiovascular: Reviewed in HPI  Respiratory: No cough or wheezing, no sputum production. No hematemesis. Gastrointestinal: No abdominal pain, appetite loss, blood in stools. No change in bowel or bladder habits. Genitourinary: No dysuria, trouble voiding, or hematuria. Musculoskeletal:  No gait disturbance, weakness or joint complaints. Integumentary: No rash or pruritis. Neurological: No headache, diplopia, change in muscle strength, numbness or tingling. No change in gait, balance, coordination, mood, affect, memory, mentation, behavior. Psychiatric: No anxiety, no depression. Endocrine: No malaise, fatigue or temperature intolerance. No excessive thirst, fluid intake, or urination. No tremor. Hematologic/Lymphatic: No abnormal bruising or bleeding, blood clots or swollen lymph nodes. Allergic/Immunologic: No nasal congestion or hives. Physical Examination:    Vitals:    03/01/23 0912   BP: 132/70   Pulse:    SpO2:             Constitutional and General Appearance:  No acute distress  Skin:good turgor,intact without lesions  HEENT: EOMI ,normal  Neck:no JVD    Respiratory:  Normal excursion and expansion without use of accessory muscles  Resp Auscultation: Normal breath sounds without dullness  Cardiovascular: The apical impulses not displaced  Heart tones are crisp and normal. Irregularly irregular. Cervical veins are not engorged  The carotid upstroke is normal in amplitude and contour without delay or bruit  Peripheral pulses are symmetrical and full  There is no clubbing, cyanosis of the extremities. No edema.   Femoral Arteries: 2+ and equal  Pedal Pulses: 2+ and equal   Abdomen:  No masses or tenderness  Liver/Spleen: No Abnormalities Noted  Neurological/Psychiatric:unusual affect  Alert and oriented in all spheres  Moves all extremities well  Exhibits normal gait balance and coordination  No abnormalities of mood, affect, memory, mentation, or behavior are noted    ECHO 6/9/2015    -Technically limited study due to lung interface.  -The patient appears to be in atrial fibrillation.  -Normal left ventricle size and systolic function with an estimated   ejection  fraction of 60%. No regional wall motion abnormalities are seen. -There is mild concentric left ventricular hypertrophy.  -Mild mitral regurgitation is present.  -Trivial aortic regurgitation is present.  -There is mild-moderate tricuspid regurgitation with RVSP estimated at 33  MmHg    6/15  CT abd     IMPRESSION: Widely patent mesenteric vessels. Other findings unchanged from the recent CT scan one day prior. Assessment:     1. Persistent afib:   HR controlled and irregular, no flutters/palpitations today  Asymptomatic  remain on Eliquis 5mg BID  CAM monitor 6/2020> Afib 100%   EKG  3/10/20> Afib, HR 54     Periodically place monitor to assess for bradycardia     2. Fatigue/SOB, chronic:   Unchanged - denies any symptoms     3. Hyperlipidemia:   3/2021>  Tc 214, Tg 80, hdl 72  ldl 126  4/25/22 >  TG 62 HDL 62 LDL 59  10/5/22 >  TG 69 HDL 69 LDL 63  Neurologist recommending LDL under 100 to help with progression of atherosclerosis which could contribute to Alzheimers. Continue on Fish oil  Continue Atorvastatin 20 mg daily (started 2/7/22)      Monitor for side effects including muscle aches and possible worsening memory loss      4. Hypertension:   stable  Continue on current medication regimen   /70 (Site: Right Upper Arm, Position: Sitting, Cuff Size: Medium Adult)   Pulse 64   Ht 5' 7\" (1.702 m)   Wt 163 lb 6.4 oz (74.1 kg)   SpO2 96%   BMI 25.59 kg/m²       5. Covid 19 in December 2020  - was asymptomatic with COVID  -  1/2021 + COVID antibody  - 7/14/21 +  COVID antibody   -  not interested in getting COVID vaccine for his wife or himself, but is interested in rechecking for COVID antibody at least one year COVID infection.         Plan:  Cardiac test and lab results personally reviewed by me during this office visit and discussed. Periodically place monitor to assess for bradycardia  Place cardiac monitor (7 day)   Continue risk factor modifications. Call for any change in symptoms, call to report any changes in shortness of breath or development of chest pain with activity. Follow up in 6 mos    I appreciate the opportunity of cooperating in the care of this individual.    Clarita Aguirre M.D., [facility] [place]      Patient's problem list, medications, allergies, past medical, surgical, social and family histories were reviewed and updated as appropriate. Scribe's attestation: This note was scribed in the presence of Dr Pat Aguirre by Wade Jesus, DOUGLAS. The scribe's documentation has been prepared under my direction and personally reviewed by me in its entirety. I confirm that the note above accurately reflects all work, treatment, procedures, and medical decision making performed by me.

## 2023-03-01 ENCOUNTER — OFFICE VISIT (OUTPATIENT)
Dept: CARDIOLOGY CLINIC | Age: 83
End: 2023-03-01
Payer: MEDICARE

## 2023-03-01 VITALS
HEIGHT: 67 IN | DIASTOLIC BLOOD PRESSURE: 70 MMHG | BODY MASS INDEX: 25.65 KG/M2 | WEIGHT: 163.4 LBS | OXYGEN SATURATION: 96 % | HEART RATE: 64 BPM | SYSTOLIC BLOOD PRESSURE: 132 MMHG

## 2023-03-01 DIAGNOSIS — I48.19 PERSISTENT ATRIAL FIBRILLATION (HCC): Primary | ICD-10-CM

## 2023-03-01 DIAGNOSIS — I10 ESSENTIAL HYPERTENSION: ICD-10-CM

## 2023-03-01 DIAGNOSIS — Z86.16 HISTORY OF COVID-19: ICD-10-CM

## 2023-03-01 DIAGNOSIS — E78.5 HYPERLIPIDEMIA, UNSPECIFIED HYPERLIPIDEMIA TYPE: ICD-10-CM

## 2023-03-01 DIAGNOSIS — R06.02 SOB (SHORTNESS OF BREATH): ICD-10-CM

## 2023-03-01 PROCEDURE — 99214 OFFICE O/P EST MOD 30 MIN: CPT | Performed by: INTERNAL MEDICINE

## 2023-03-01 PROCEDURE — 1123F ACP DISCUSS/DSCN MKR DOCD: CPT | Performed by: INTERNAL MEDICINE

## 2023-03-01 PROCEDURE — 3078F DIAST BP <80 MM HG: CPT | Performed by: INTERNAL MEDICINE

## 2023-03-01 PROCEDURE — 3074F SYST BP LT 130 MM HG: CPT | Performed by: INTERNAL MEDICINE

## 2023-03-01 NOTE — PATIENT INSTRUCTIONS
Cardiac monitor for 7 days  Continue risk factor modifications. Call for any change in symptoms, call to report any changes in shortness of breath or development of chest pain with activity.     Follow up in 6 mos

## 2023-03-20 DIAGNOSIS — E78.5 HYPERLIPIDEMIA, UNSPECIFIED HYPERLIPIDEMIA TYPE: ICD-10-CM

## 2023-03-20 NOTE — TELEPHONE ENCOUNTER
Received refill request for Atorvastatin from [facility].     Last ov:03/01/2023 LES    Last labs:04/25/2022 Lipid    Last Refill:08/24/2022    Next appointment:09/11/2023 LES

## 2023-03-20 NOTE — TELEPHONE ENCOUNTER
Medication Refill    Medication needing refilled:atorvastatin (LIPITOR)     Dosage of the medication:20 MG tablet    How are you taking this medication (QD, BID, TID, QID, PRN):Take 1 tablet by mouth daily    30 or 90 day supply called in:90 tablet    When will you run out of your medication:1 week    Which Pharmacy are we sending the medication to?:    [facility] [address]      robert

## 2023-03-21 RX ORDER — ATORVASTATIN CALCIUM 20 MG/1
20 TABLET, FILM COATED ORAL DAILY
Qty: 90 TABLET | Refills: 3 | Status: SHIPPED | OUTPATIENT
Start: 2023-03-21

## 2023-03-24 ENCOUNTER — TELEPHONE (OUTPATIENT)
Dept: CARDIOLOGY CLINIC | Age: 83
End: 2023-03-24

## 2023-03-24 NOTE — TELEPHONE ENCOUNTER
Dimitrios Ramirez reporting 7 day E Patch has been recorded and filed and sent for your review. They noted it showed a 4.1 second pause. Please advise.

## 2023-04-26 NOTE — TELEPHONE ENCOUNTER
Last OV:03/01/2023  Phyllis  Last Labs:[facility]-10/05.2022  [facility]  Next OV:09/11/2023  Phyllis  Last Refill: Eliquis-02/01/2023  Er[facility] Grey

## 2023-08-15 RX ORDER — LISINOPRIL 20 MG/1
20 TABLET ORAL DAILY
Qty: 90 TABLET | Refills: 3 | Status: SHIPPED | OUTPATIENT
Start: 2023-08-15

## 2023-08-15 NOTE — TELEPHONE ENCOUNTER
From: Mitch Jiménez  To:  Office of Dr. Octavio Suarez: 7/26/2023 8:20 PM EDT  Subject: Medication Renewal Request    Refills have been requested for the following medications:     lisinopril (PRINIVIL;ZESTRIL) 20 MG tablet [Dr. Samantha Calvillo MD]    Preferred pharmacy: [address] [place] [address] [address] [phone] - F [phone]

## 2023-08-15 NOTE — TELEPHONE ENCOUNTER
Last OV: 3/1/23 les  Last Labs: 10/5/22 cmp care everywhere  Last refill:6/22/22  Next appt:  9/11/23 les

## 2023-08-29 NOTE — PROGRESS NOTES
risk factor modifications. Call for any change in symptoms, call to report any changes in shortness of breath or development of chest pain with activity. Follow up in 6 mos      I appreciate the opportunity of cooperating in the care of this individual.    Ashlie Veras M.D., [facility] [place]      Patient's problem list, medications, allergies, past medical, surgical, social and family histories were reviewed and updated as appropriate. Scribe's attestation: This note was scribed in the presence of Dr Jade Veras by Mateo Hadley RN. The scribe's documentation has been prepared under my direction and personally reviewed by me in its entirety. I confirm that the note above accurately reflects all work, treatment, procedures, and medical decision making performed by me.

## 2023-09-11 ENCOUNTER — OFFICE VISIT (OUTPATIENT)
Dept: CARDIOLOGY CLINIC | Age: 83
End: 2023-09-11
Payer: MEDICARE

## 2023-09-11 VITALS
HEIGHT: 67 IN | DIASTOLIC BLOOD PRESSURE: 70 MMHG | BODY MASS INDEX: 25.96 KG/M2 | SYSTOLIC BLOOD PRESSURE: 120 MMHG | OXYGEN SATURATION: 98 % | WEIGHT: 165.4 LBS | HEART RATE: 63 BPM

## 2023-09-11 DIAGNOSIS — R06.02 SOB (SHORTNESS OF BREATH): ICD-10-CM

## 2023-09-11 DIAGNOSIS — I48.19 PERSISTENT ATRIAL FIBRILLATION (HCC): Primary | ICD-10-CM

## 2023-09-11 DIAGNOSIS — Z86.16 HISTORY OF COVID-19: ICD-10-CM

## 2023-09-11 DIAGNOSIS — I10 ESSENTIAL HYPERTENSION: ICD-10-CM

## 2023-09-11 DIAGNOSIS — E78.5 HYPERLIPIDEMIA, UNSPECIFIED HYPERLIPIDEMIA TYPE: ICD-10-CM

## 2023-09-11 PROCEDURE — 99214 OFFICE O/P EST MOD 30 MIN: CPT | Performed by: INTERNAL MEDICINE

## 2023-09-11 PROCEDURE — 1123F ACP DISCUSS/DSCN MKR DOCD: CPT | Performed by: INTERNAL MEDICINE

## 2023-09-11 PROCEDURE — 3074F SYST BP LT 130 MM HG: CPT | Performed by: INTERNAL MEDICINE

## 2023-09-11 PROCEDURE — 3078F DIAST BP <80 MM HG: CPT | Performed by: INTERNAL MEDICINE

## 2023-09-11 NOTE — PATIENT INSTRUCTIONS
7 day event monitor   Labs through pcp  Continue risk factor modifications. Call for any change in symptoms, call to report any changes in shortness of breath or development of chest pain with activity.     Follow up in 6 mos

## 2023-10-02 ENCOUNTER — TELEPHONE (OUTPATIENT)
Dept: CARDIOLOGY CLINIC | Age: 83
End: 2023-10-02

## 2023-10-02 PROBLEM — R00.1 BRADYCARDIA: Status: ACTIVE | Noted: 2023-10-02

## 2023-10-02 NOTE — TELEPHONE ENCOUNTER
Per M - He disucssed with Dr. Ronni Morin. Would like to schedule for pacemaker ASAP. He will attempt to contact patient as well. When patient has been notified will plan single chamber pacemaker.

## 2023-10-02 NOTE — TELEPHONE ENCOUNTER
GODFREY reviewed by Dr. Antonina Campuzano. Per Brodie Fortune RN, he would like them to see EP regarding bradycardia. LM for Pat to discuss monitor results and schedule her with EP.  She can be added to [facility] on 10/12/2023

## 2023-10-11 PROBLEM — I10 PRIMARY HYPERTENSION: Status: ACTIVE | Noted: 2023-10-11

## 2023-10-11 NOTE — PROGRESS NOTES
of SCD. Relevant and available labs, and cardiovascular diagnostics reviewed. Reviewed. I independently reviewed all cardiac tracing. I independently reviewed relevant and available cardiac diagnostic tests ECG, CXR, Echo, Stress test, Device interrogation, Holter, CT scan. Outside medical records via Care everywhere reviewed and summarized in H&P above. Complex medical condition with multiple medical problems affecting prognosis and outcome of EP interventions       - The patient is counseled to follow a low salt diet to assure blood pressure remains controlled for cardiovascular risk factor modification.   - The patient is counseled to avoid excess caffeine, and energy drinks as this may exacerbated ectopy and arrhythmia. - The patient is counseled to get regular exercise 3-5 times per week to control cardiovascular risk factors. All questions and concerns were addressed to the patient/family. Alternatives to my treatment were discussed. I have discussed the above stated plan and the patient verbalized understanding and agreed with the plan. Scribe attestation: This note was scribed in the presence of Abi Ramos MD by Mary Rico RN    I, Dr. Abi Ramos personally performed the services described in this documentation as scribed by RN in my presence, and it is both accurate and complete. NOTE: This report was transcribed using voice recognition software. Every effort was made to ensure accuracy, however, inadvertent computerized transcription errors may be present.      Abi Ramos MD, [facility]   Office: [phone]  Fax: [phone]

## 2023-10-12 ENCOUNTER — OFFICE VISIT (OUTPATIENT)
Dept: CARDIOLOGY CLINIC | Age: 83
End: 2023-10-12
Payer: MEDICARE

## 2023-10-12 VITALS
HEIGHT: 68 IN | DIASTOLIC BLOOD PRESSURE: 62 MMHG | WEIGHT: 162.2 LBS | SYSTOLIC BLOOD PRESSURE: 90 MMHG | HEART RATE: 93 BPM | BODY MASS INDEX: 24.58 KG/M2 | OXYGEN SATURATION: 97 %

## 2023-10-12 DIAGNOSIS — I10 PRIMARY HYPERTENSION: ICD-10-CM

## 2023-10-12 DIAGNOSIS — I48.19 PERSISTENT ATRIAL FIBRILLATION (HCC): ICD-10-CM

## 2023-10-12 DIAGNOSIS — R00.1 BRADYCARDIA: Primary | ICD-10-CM

## 2023-10-12 PROCEDURE — 93000 ELECTROCARDIOGRAM COMPLETE: CPT | Performed by: INTERNAL MEDICINE

## 2023-10-12 PROCEDURE — 3078F DIAST BP <80 MM HG: CPT | Performed by: INTERNAL MEDICINE

## 2023-10-12 PROCEDURE — 99204 OFFICE O/P NEW MOD 45 MIN: CPT | Performed by: INTERNAL MEDICINE

## 2023-10-12 PROCEDURE — 3074F SYST BP LT 130 MM HG: CPT | Performed by: INTERNAL MEDICINE

## 2023-10-12 PROCEDURE — 1123F ACP DISCUSS/DSCN MKR DOCD: CPT | Performed by: INTERNAL MEDICINE

## 2023-10-12 RX ORDER — DONEPEZIL HYDROCHLORIDE 5 MG/1
10 TABLET, FILM COATED ORAL DAILY
COMMUNITY
Start: 2023-07-25

## 2023-10-12 NOTE — PATIENT INSTRUCTIONS
Single chamber Pacemaker     Our  will call you to discuss a date for you procedure. The Cath Lab will call you a week before your procedure. The night before your procedure you will need to scrub with Hibiclens wash. The day of your procedure you will need to check in at the registration desk, which is in the main lobby at [address] [place] [address] Road will need to fast for at least 8 hours prior to your procedure. You will need  to hold Eliquis for day before and morning of    You may take all other medications with a sip of water the morning of your procedure. Please have a responsible adult to drive you home upon discharge. The discharging unit will be giving you discharge instructions. If you have any questions regarding your procedure itself or your medications, please call [phone] and ask to talk to an EP nurse. You will be seen in the office in 1 week for a wound check and then 3 months following implantation. So, you are starting Eliquis (Apixaban)? Please see below with helpful tips on lessening the cost:  ALL Commercial Insurance pts should get the $10 copay card. IF we do not have them - you can go to [employer] and activate the card from the website. These must be activated before presenting this at the pharmacy. This step is crucial!!! Otherwise, the pharmacy will state the card is invalid. The card is good for 24 months upon activation - once 24 months is used, you will get another card (or go to website) and active another card.   Medicare patients with Part D -   Will pay their standard copay amount (typically $10-$60 month/max)  When hit [facility] - Will pay 25% of the drug cost (will be $140/month based on $561 wholesale retail cost of the drug  If can't afford the $140 - call 8-354-[employer] to ask about resources available (Jacinta said it helps to say resources available and not what assistance is

## 2023-10-16 ENCOUNTER — TELEPHONE (OUTPATIENT)
Dept: CARDIOLOGY CLINIC | Age: 83
End: 2023-10-16

## 2023-10-16 NOTE — TELEPHONE ENCOUNTER
Spoke with the patient and she has decided not to move forward with the device implant. Procedure - Single Chamber PPM   Date:  Arrival time:   Procedure time:        The day of your procedure you will need to check in at the registration desk, which is in the main lobby at [address] will need to fast for at least 8 hours prior to your procedure. You will need  to hold Eliquis for day before and morning of               You may take all other medications with a sip of water the morning of your procedure. The night before your procedure you will need to scrub with Hibiclens wash. Please have a responsible adult to drive you home upon discharge. The discharging unit will be giving you discharge instructions. If you have any questions regarding your procedure itself or your medications, please call [phone] and ask to talk to an EP nurse. You will be seen in the office in 1 week for a wound check and then 3 months following implantation.

## 2024-01-22 NOTE — TELEPHONE ENCOUNTER
Received refill request for Eliquis from [facility] pharmacy.    Last ov: 09/11/2023 LES    Last Refill: 04/26/2023 #180 w/ 1 refill    Next appointment:03/27/2024 LES

## 2024-03-20 PROBLEM — R53.83 FATIGUE: Status: ACTIVE | Noted: 2024-03-20

## 2024-03-20 NOTE — PROGRESS NOTES
[facility]   Cardiac Follow Up     Referring Provider:  Deniz Blackburn MD     Chief Complaint   Patient presents with    Atrial Fibrillation        History of Present Illness:   Mrs Ji is a 84 y.o. female with a history of persistent Atrial fib, hypertension, and hyperlipidemia.  She was diagnosed with Alzheimer's by  neurologist. She enjoys doing puzzles with her .    In July 2022, she presented to Dr Wilcox ([facility]) with a new concern regarding urination. Onset of symptoms was 2 days ago. She c/o hematuria and incontinence. Urinary symptoms waxing and waning since onset. She was started on Keflex.     In the interval, cardiac event monitor results received showing afib with severe bradycardia, heart rate 24.  She established with Dr. Del Valle on 10/12/23 with the recommendation of single chamber PPM.  Lydia later decided to not move forward with the device implant.      Today, Lydia is accompanied by her .  They will be  for 65 years on July 11th this year.  States that she feels fine all of the time.   states that she is sleepy all of the time and thinks it is due to the Alzheimer.   feels like her BP is usually controlled at home.  She had a MRI of her head yesterday.  Patient denies exertional chest pain, SARGENT/PND, palpitations, light-headedness, edema.      Past Medical History:   has a past medical history of Atrial fibrillation (HCC), Cancer (HCC), MVP (mitral valve prolapse), and Thyroid disorder.    Surgical History:   has a past surgical history that includes Colonoscopy; Hysterectomy; Tonsillectomy; Cholecystectomy; Appendectomy; hemicolectomy (7-17-13); and Colon surgery.     Social History:   reports that she has never smoked. She has never used smokeless tobacco. She reports that she does not drink alcohol and does not use drugs.     Family History:  No h/o premature CAD     Home Medications:  Prior to Admission medications    Medication

## 2024-03-27 ENCOUNTER — OFFICE VISIT (OUTPATIENT)
Dept: CARDIOLOGY CLINIC | Age: 84
End: 2024-03-27
Payer: MEDICARE

## 2024-03-27 VITALS
SYSTOLIC BLOOD PRESSURE: 150 MMHG | OXYGEN SATURATION: 97 % | HEIGHT: 67 IN | WEIGHT: 171.8 LBS | BODY MASS INDEX: 26.97 KG/M2 | DIASTOLIC BLOOD PRESSURE: 78 MMHG | HEART RATE: 70 BPM

## 2024-03-27 DIAGNOSIS — R00.1 BRADYCARDIA: ICD-10-CM

## 2024-03-27 DIAGNOSIS — E78.5 HYPERLIPIDEMIA, UNSPECIFIED HYPERLIPIDEMIA TYPE: ICD-10-CM

## 2024-03-27 DIAGNOSIS — I48.19 PERSISTENT ATRIAL FIBRILLATION (HCC): Primary | ICD-10-CM

## 2024-03-27 DIAGNOSIS — R53.83 FATIGUE, UNSPECIFIED TYPE: ICD-10-CM

## 2024-03-27 DIAGNOSIS — I10 PRIMARY HYPERTENSION: ICD-10-CM

## 2024-03-27 DIAGNOSIS — R06.02 SOB (SHORTNESS OF BREATH): ICD-10-CM

## 2024-03-27 PROCEDURE — 1123F ACP DISCUSS/DSCN MKR DOCD: CPT | Performed by: INTERNAL MEDICINE

## 2024-03-27 PROCEDURE — 3077F SYST BP >= 140 MM HG: CPT | Performed by: INTERNAL MEDICINE

## 2024-03-27 PROCEDURE — 99214 OFFICE O/P EST MOD 30 MIN: CPT | Performed by: INTERNAL MEDICINE

## 2024-03-27 PROCEDURE — 3078F DIAST BP <80 MM HG: CPT | Performed by: INTERNAL MEDICINE

## 2024-03-27 RX ORDER — LISINOPRIL 20 MG/1
20 TABLET ORAL DAILY
Qty: 90 TABLET | Refills: 3 | Status: SHIPPED | OUTPATIENT
Start: 2024-03-27

## 2024-03-27 RX ORDER — PRAVASTATIN SODIUM 20 MG
20 TABLET ORAL NIGHTLY
COMMUNITY

## 2024-03-27 NOTE — PATIENT INSTRUCTIONS
Continue current medications    Call office if you change your mind on having a pacemaker inserted    Follow up with Dr. Ferguson in 6 months

## 2024-04-02 ENCOUNTER — TELEPHONE (OUTPATIENT)
Dept: CARDIOLOGY CLINIC | Age: 84
End: 2024-04-02

## 2024-04-02 NOTE — TELEPHONE ENCOUNTER
Dr. Pena  from   Neurology phoned asking to speak with LES.  Please call her cell [phone].  Thank you

## 2024-04-02 NOTE — TELEPHONE ENCOUNTER
Spoke to Henry.    Watchman procedure discussed > Henry will call back to inform us about decision on Watchman implantation.

## 2024-04-02 NOTE — TELEPHONE ENCOUNTER
I spoke with Dr Pena. Will refer for Watchman if patient agrees. She would also require a pacemaker

## 2024-04-18 ENCOUNTER — PATIENT MESSAGE (OUTPATIENT)
Dept: CARDIOLOGY CLINIC | Age: 84
End: 2024-04-18

## 2024-04-18 NOTE — TELEPHONE ENCOUNTER
From: Lydia Ji  To: Dr. Shilo Ferguson  Sent: 4/18/2024 2:44 PM EDT  Subject: Ameena Thomas.     My father and I would like to discuss the watchman surgery and the pacemaker before making the final decision to proceed.     Thank you,   Ene Delcid  Daughter

## 2024-05-08 NOTE — PROGRESS NOTES
[facility]   Cardiac Follow Up     Referring Provider:  Deniz Blackburn MD     Chief Complaint   Patient presents with    Atrial Fibrillation        History of Present Illness:   Mrs Ji is a 84 y.o. female with a history of persistent Atrial fib, hypertension, and hyperlipidemia.  She was diagnosed with Alzheimer's by  neurologist. She enjoys doing puzzles with her . They will be  for 65 years on July 11th 2024.      In July 2022, she presented to Dr Wilcox ([facility]) with a new concern regarding urination. Onset of symptoms was 2 days ago. She c/o hematuria and incontinence. Urinary symptoms waxing and waning since onset. She was started on Keflex.     In the interval, cardiac event monitor results received showing afib with severe bradycardia, heart rate 24.  She established with Dr. Del Valle on 10/12/23 with the recommendation of single chamber PPM.  Lydia later decided to not move forward with the device implant.      Today, Lydia is accompanied by her  and daughter.  They are here to discuss watchman and PM. MD, Althea Pena is her neurologist who recommends no longer taking Eliquis due to risk of serious CNS bleed.  Mentally,  states Lydia remains unchanged over last ~5 years.     Past Medical History:   has a past medical history of Atrial fibrillation (HCC), Cancer (HCC), MVP (mitral valve prolapse), and Thyroid disorder.    Surgical History:   has a past surgical history that includes Colonoscopy; Hysterectomy; Tonsillectomy; Cholecystectomy; Appendectomy; hemicolectomy (7-17-13); and Colon surgery.     Social History:   reports that she has never smoked. She has never used smokeless tobacco. She reports that she does not drink alcohol and does not use drugs.     Family History:  No h/o premature CAD     Home Medications:  Prior to Admission medications    Medication Sig Start Date End Date Taking? Authorizing Provider   pravastatin (PRAVACHOL)

## 2024-05-13 ENCOUNTER — OFFICE VISIT (OUTPATIENT)
Dept: CARDIOLOGY CLINIC | Age: 84
End: 2024-05-13
Payer: MEDICARE

## 2024-05-13 VITALS
SYSTOLIC BLOOD PRESSURE: 120 MMHG | OXYGEN SATURATION: 97 % | DIASTOLIC BLOOD PRESSURE: 62 MMHG | WEIGHT: 169.2 LBS | BODY MASS INDEX: 26.56 KG/M2 | HEIGHT: 67 IN | HEART RATE: 62 BPM

## 2024-05-13 DIAGNOSIS — E78.5 HYPERLIPIDEMIA, UNSPECIFIED HYPERLIPIDEMIA TYPE: ICD-10-CM

## 2024-05-13 DIAGNOSIS — I48.19 PERSISTENT ATRIAL FIBRILLATION (HCC): Primary | ICD-10-CM

## 2024-05-13 DIAGNOSIS — Z86.16 HISTORY OF COVID-19: ICD-10-CM

## 2024-05-13 DIAGNOSIS — R53.83 FATIGUE, UNSPECIFIED TYPE: ICD-10-CM

## 2024-05-13 DIAGNOSIS — R06.02 SOB (SHORTNESS OF BREATH): ICD-10-CM

## 2024-05-13 PROCEDURE — 3074F SYST BP LT 130 MM HG: CPT | Performed by: INTERNAL MEDICINE

## 2024-05-13 PROCEDURE — 1123F ACP DISCUSS/DSCN MKR DOCD: CPT | Performed by: INTERNAL MEDICINE

## 2024-05-13 PROCEDURE — 99214 OFFICE O/P EST MOD 30 MIN: CPT | Performed by: INTERNAL MEDICINE

## 2024-05-13 PROCEDURE — 3078F DIAST BP <80 MM HG: CPT | Performed by: INTERNAL MEDICINE

## 2024-05-13 NOTE — PATIENT INSTRUCTIONS
Inform Dr Ferguson about decision regarding Watchman/ Pacemaker  Continue risk factor modifications.   Call for any change in symptoms, call to report any changes in shortness of breath or development of chest pain with activity.    Follow up October 10 as scheduled

## 2024-09-05 NOTE — TELEPHONE ENCOUNTER
Received refill request for eliquis from [facility] pharmacy.    Last ov:05/13/2024 LES    Last Refill: 03/27/2024 #180 w/ 1    Next appointment: 10/10/2024 LES

## 2024-09-30 NOTE — PROGRESS NOTES
[facility]   Cardiac Follow Up     Referring Provider:  Deniz Blackburn MD     Chief Complaint   Patient presents with    Atrial Fibrillation        History of Present Illness:   Mrs Ji is a 84 y.o. female with a history of persistent Atrial fib, hypertension, and hyperlipidemia.  She was diagnosed with Alzheimer's by  neurologist. She enjoys doing puzzles with her . They will be  for 65 years on July 11th 2024.      In July 2022, she presented to Dr Wilcox ([facility]) with a new concern regarding urination. Onset of symptoms was 2 days ago. She c/o hematuria and incontinence. Urinary symptoms waxing and waning since onset. She was started on Keflex.     In the interval, cardiac event monitor results received showing afib with severe bradycardia, heart rate 24.  She established with Dr. Del aVlle on 10/12/23 with the recommendation of single chamber PPM.  Lydia later decided to not move forward with the device implant.      Today, Lydia is here for a 5 month follow up.  She is accompanied by her .  Reports that she is doing very well and has been feeling very well.  States that they have had a great Summer.  She decided to not go through any of the procedures.  She is tolerating the Eliquis.  Patient denies exertional chest pain, SARGENT/PND, palpitations, light-headedness, edema.      Past Medical History:   has a past medical history of Atrial fibrillation (HCC), Cancer (HCC), MVP (mitral valve prolapse), and Thyroid disorder.    Surgical History:   has a past surgical history that includes Colonoscopy; Hysterectomy; Tonsillectomy; Cholecystectomy; Appendectomy; hemicolectomy (7-17-13); and Colon surgery.     Social History:   reports that she has never smoked. She has never used smokeless tobacco. She reports that she does not drink alcohol and does not use drugs.     Family History:  No h/o premature CAD     Home Medications:  Prior to Admission medications    Medication

## 2024-10-10 ENCOUNTER — OFFICE VISIT (OUTPATIENT)
Dept: CARDIOLOGY CLINIC | Age: 84
End: 2024-10-10
Payer: MEDICARE

## 2024-10-10 VITALS
DIASTOLIC BLOOD PRESSURE: 52 MMHG | SYSTOLIC BLOOD PRESSURE: 92 MMHG | BODY MASS INDEX: 25.31 KG/M2 | OXYGEN SATURATION: 97 % | HEART RATE: 55 BPM | WEIGHT: 167 LBS | HEIGHT: 68 IN

## 2024-10-10 DIAGNOSIS — I10 PRIMARY HYPERTENSION: ICD-10-CM

## 2024-10-10 DIAGNOSIS — E78.5 HYPERLIPIDEMIA, UNSPECIFIED HYPERLIPIDEMIA TYPE: ICD-10-CM

## 2024-10-10 DIAGNOSIS — R06.02 SOB (SHORTNESS OF BREATH): ICD-10-CM

## 2024-10-10 DIAGNOSIS — I48.19 PERSISTENT ATRIAL FIBRILLATION (HCC): Primary | ICD-10-CM

## 2024-10-10 DIAGNOSIS — R53.83 FATIGUE, UNSPECIFIED TYPE: ICD-10-CM

## 2024-10-10 PROCEDURE — 3078F DIAST BP <80 MM HG: CPT | Performed by: INTERNAL MEDICINE

## 2024-10-10 PROCEDURE — 93000 ELECTROCARDIOGRAM COMPLETE: CPT | Performed by: INTERNAL MEDICINE

## 2024-10-10 PROCEDURE — 1123F ACP DISCUSS/DSCN MKR DOCD: CPT | Performed by: INTERNAL MEDICINE

## 2024-10-10 PROCEDURE — 99214 OFFICE O/P EST MOD 30 MIN: CPT | Performed by: INTERNAL MEDICINE

## 2024-10-10 PROCEDURE — 3074F SYST BP LT 130 MM HG: CPT | Performed by: INTERNAL MEDICINE

## 2025-04-01 ENCOUNTER — TELEPHONE (OUTPATIENT)
Dept: CARDIOLOGY CLINIC | Age: 85
End: 2025-04-01

## 2025-04-01 NOTE — TELEPHONE ENCOUNTER
Received records request for monitor 09/11/2023. Faxed records to [employer] for authorization.

## 2025-04-02 ENCOUNTER — TELEPHONE (OUTPATIENT)
Dept: CARDIOLOGY CLINIC | Age: 85
End: 2025-04-02